# Patient Record
Sex: FEMALE | Race: WHITE
[De-identification: names, ages, dates, MRNs, and addresses within clinical notes are randomized per-mention and may not be internally consistent; named-entity substitution may affect disease eponyms.]

---

## 2019-03-14 ENCOUNTER — HOSPITAL ENCOUNTER (EMERGENCY)
Dept: HOSPITAL 8 - ED | Age: 29
Discharge: HOME | End: 2019-03-14
Payer: MEDICAID

## 2019-03-14 VITALS — HEIGHT: 67 IN | WEIGHT: 293 LBS | BODY MASS INDEX: 45.99 KG/M2

## 2019-03-14 VITALS — SYSTOLIC BLOOD PRESSURE: 123 MMHG | DIASTOLIC BLOOD PRESSURE: 85 MMHG

## 2019-03-14 DIAGNOSIS — J06.9: Primary | ICD-10-CM

## 2019-03-14 LAB
ALBUMIN SERPL-MCNC: 3.8 G/DL (ref 3.4–5)
ANION GAP SERPL CALC-SCNC: 8 MMOL/L (ref 5–15)
BASOPHILS # BLD AUTO: 0.06 X10^3/UL (ref 0–0.1)
BASOPHILS NFR BLD AUTO: 1 % (ref 0–1)
CALCIUM SERPL-MCNC: 9 MG/DL (ref 8.5–10.1)
CHLORIDE SERPL-SCNC: 108 MMOL/L (ref 98–107)
CREAT SERPL-MCNC: 0.98 MG/DL (ref 0.55–1.02)
EOSINOPHIL # BLD AUTO: 0.17 X10^3/UL (ref 0–0.4)
EOSINOPHIL NFR BLD AUTO: 2 % (ref 1–7)
ERYTHROCYTE [DISTWIDTH] IN BLOOD BY AUTOMATED COUNT: 13.7 % (ref 9.6–15.2)
INR PPP: 1.09 (ref 0.93–1.1)
LYMPHOCYTES # BLD AUTO: 2.08 X10^3/UL (ref 1–3.4)
LYMPHOCYTES NFR BLD AUTO: 30 % (ref 22–44)
MCH RBC QN AUTO: 30 PG (ref 27–34.8)
MCHC RBC AUTO-ENTMCNC: 34 G/DL (ref 32.4–35.8)
MCV RBC AUTO: 88.2 FL (ref 80–100)
MD: NO
MONOCYTES # BLD AUTO: 0.98 X10^3/UL (ref 0.2–0.8)
MONOCYTES NFR BLD AUTO: 14 % (ref 2–9)
NEUTROPHILS # BLD AUTO: 3.55 X10^3/UL (ref 1.8–6.8)
NEUTROPHILS NFR BLD AUTO: 52 % (ref 42–75)
PLATELET # BLD AUTO: 267 X10^3/UL (ref 130–400)
PMV BLD AUTO: 8.4 FL (ref 7.4–10.4)
PROTHROMBIN TIME: 11.4 SECONDS (ref 9.6–11.5)
RBC # BLD AUTO: 5.86 X10^6/UL (ref 3.82–5.3)
TROPONIN I SERPL-MCNC: < 0.015 NG/ML (ref 0–0.04)

## 2019-03-14 PROCEDURE — 85610 PROTHROMBIN TIME: CPT

## 2019-03-14 PROCEDURE — 99284 EMERGENCY DEPT VISIT MOD MDM: CPT

## 2019-03-14 PROCEDURE — 82040 ASSAY OF SERUM ALBUMIN: CPT

## 2019-03-14 PROCEDURE — 93005 ELECTROCARDIOGRAM TRACING: CPT

## 2019-03-14 PROCEDURE — 85025 COMPLETE CBC W/AUTO DIFF WBC: CPT

## 2019-03-14 PROCEDURE — 84484 ASSAY OF TROPONIN QUANT: CPT

## 2019-03-14 PROCEDURE — 36415 COLL VENOUS BLD VENIPUNCTURE: CPT

## 2019-03-14 PROCEDURE — 94640 AIRWAY INHALATION TREATMENT: CPT

## 2019-03-14 PROCEDURE — 71046 X-RAY EXAM CHEST 2 VIEWS: CPT

## 2019-03-14 PROCEDURE — 80048 BASIC METABOLIC PNL TOTAL CA: CPT

## 2019-03-14 NOTE — NUR
FIRST CONTACT WITH PATIENT. 27 Y/O FEMALE PRESENTS TO ED WITH C/O COUGH 
CONGESTIONS X 1 WEEK. "I'VE BEEN SICK FOR ABOUT A WEEK. THE LAST TIME I WAS 
SICK LIKE THIS I WAS IN HEART FAILURE. I'VE HAD A LOT OF SURGERIES ON MY HEART 
AND CARDIAC HISTORY. I JUST WANT TO MAKE SURE. I HAVE SOME CONSTANT CP. IT 
STARTED YESTERDAY. THE PAIN GOT WORSE TODAY SO I CAME IN." NO ACUTE DISTRESS 
NOTED. NO C/O N/V/D, TRAUMA, SYNCOPE. PT PLACED ON CONT PULSE OX, NIBP, CARDIAC 
MONITOR.

## 2019-08-07 ENCOUNTER — HOSPITAL ENCOUNTER (EMERGENCY)
Dept: HOSPITAL 8 - ED | Age: 29
LOS: 1 days | Discharge: HOME | End: 2019-08-08
Payer: MEDICAID

## 2019-08-07 VITALS — WEIGHT: 293 LBS | BODY MASS INDEX: 45.99 KG/M2 | HEIGHT: 67 IN

## 2019-08-07 DIAGNOSIS — R07.2: Primary | ICD-10-CM

## 2019-08-07 PROCEDURE — 71046 X-RAY EXAM CHEST 2 VIEWS: CPT

## 2019-08-07 PROCEDURE — 93005 ELECTROCARDIOGRAM TRACING: CPT

## 2019-08-07 PROCEDURE — 36415 COLL VENOUS BLD VENIPUNCTURE: CPT

## 2019-08-07 PROCEDURE — 96376 TX/PRO/DX INJ SAME DRUG ADON: CPT

## 2019-08-07 PROCEDURE — 85025 COMPLETE CBC W/AUTO DIFF WBC: CPT

## 2019-08-07 PROCEDURE — 82040 ASSAY OF SERUM ALBUMIN: CPT

## 2019-08-07 PROCEDURE — 84484 ASSAY OF TROPONIN QUANT: CPT

## 2019-08-07 PROCEDURE — 96374 THER/PROPH/DIAG INJ IV PUSH: CPT

## 2019-08-07 PROCEDURE — 96375 TX/PRO/DX INJ NEW DRUG ADDON: CPT

## 2019-08-07 PROCEDURE — 83880 ASSAY OF NATRIURETIC PEPTIDE: CPT

## 2019-08-07 PROCEDURE — 80048 BASIC METABOLIC PNL TOTAL CA: CPT

## 2019-08-07 PROCEDURE — 99284 EMERGENCY DEPT VISIT MOD MDM: CPT

## 2019-08-07 PROCEDURE — 85610 PROTHROMBIN TIME: CPT

## 2019-08-07 PROCEDURE — 85379 FIBRIN DEGRADATION QUANT: CPT

## 2019-08-07 PROCEDURE — 71275 CT ANGIOGRAPHY CHEST: CPT

## 2019-08-08 VITALS — SYSTOLIC BLOOD PRESSURE: 98 MMHG | DIASTOLIC BLOOD PRESSURE: 68 MMHG

## 2019-11-18 ENCOUNTER — HOSPITAL ENCOUNTER (EMERGENCY)
Dept: HOSPITAL 8 - ED | Age: 29
Discharge: HOME | End: 2019-11-18
Payer: MEDICAID

## 2019-11-18 VITALS — BODY MASS INDEX: 45.99 KG/M2 | HEIGHT: 67 IN | WEIGHT: 293 LBS

## 2019-11-18 VITALS — DIASTOLIC BLOOD PRESSURE: 60 MMHG | SYSTOLIC BLOOD PRESSURE: 113 MMHG

## 2019-11-18 DIAGNOSIS — R07.89: Primary | ICD-10-CM

## 2019-11-18 DIAGNOSIS — Z79.01: ICD-10-CM

## 2019-11-18 LAB
ALBUMIN SERPL-MCNC: 3.5 G/DL (ref 3.4–5)
ALP SERPL-CCNC: 64 U/L (ref 45–117)
ALT SERPL-CCNC: 64 U/L (ref 12–78)
ANION GAP SERPL CALC-SCNC: 8 MMOL/L (ref 5–15)
APTT BLD: 58 SECONDS (ref 25–31)
BASOPHILS # BLD AUTO: 0.05 X10^3/UL (ref 0–0.1)
BASOPHILS NFR BLD AUTO: 1 % (ref 0–1)
BILIRUB SERPL-MCNC: 0.6 MG/DL (ref 0.2–1)
CALCIUM SERPL-MCNC: 8.8 MG/DL (ref 8.5–10.1)
CHLORIDE SERPL-SCNC: 106 MMOL/L (ref 98–107)
CREAT SERPL-MCNC: 0.89 MG/DL (ref 0.55–1.02)
EOSINOPHIL # BLD AUTO: 0.19 X10^3/UL (ref 0–0.4)
EOSINOPHIL NFR BLD AUTO: 2 % (ref 1–7)
ERYTHROCYTE [DISTWIDTH] IN BLOOD BY AUTOMATED COUNT: 13.3 % (ref 9.6–15.2)
INR PPP: 5.5 (ref 0.93–1.1)
LYMPHOCYTES # BLD AUTO: 1.81 X10^3/UL (ref 1–3.4)
LYMPHOCYTES NFR BLD AUTO: 20 % (ref 22–44)
MCH RBC QN AUTO: 30.3 PG (ref 27–34.8)
MCHC RBC AUTO-ENTMCNC: 33.2 G/DL (ref 32.4–35.8)
MCV RBC AUTO: 91.3 FL (ref 80–100)
MD: NO
MONOCYTES # BLD AUTO: 0.56 X10^3/UL (ref 0.2–0.8)
MONOCYTES NFR BLD AUTO: 6 % (ref 2–9)
NEUTROPHILS # BLD AUTO: 6.26 X10^3/UL (ref 1.8–6.8)
NEUTROPHILS NFR BLD AUTO: 71 % (ref 42–75)
PLATELET # BLD AUTO: 288 X10^3/UL (ref 130–400)
PMV BLD AUTO: 8.4 FL (ref 7.4–10.4)
PROT SERPL-MCNC: 8 G/DL (ref 6.4–8.2)
PROTHROMBIN TIME: 54.1 SECONDS (ref 9.6–11.5)
RBC # BLD AUTO: 5.48 X10^6/UL (ref 3.82–5.3)
TROPONIN I SERPL-MCNC: < 0.015 NG/ML (ref 0–0.04)

## 2019-11-18 PROCEDURE — 84484 ASSAY OF TROPONIN QUANT: CPT

## 2019-11-18 PROCEDURE — 85610 PROTHROMBIN TIME: CPT

## 2019-11-18 PROCEDURE — 83880 ASSAY OF NATRIURETIC PEPTIDE: CPT

## 2019-11-18 PROCEDURE — 36415 COLL VENOUS BLD VENIPUNCTURE: CPT

## 2019-11-18 PROCEDURE — 99284 EMERGENCY DEPT VISIT MOD MDM: CPT

## 2019-11-18 PROCEDURE — 71046 X-RAY EXAM CHEST 2 VIEWS: CPT

## 2019-11-18 PROCEDURE — 85730 THROMBOPLASTIN TIME PARTIAL: CPT

## 2019-11-18 PROCEDURE — 85025 COMPLETE CBC W/AUTO DIFF WBC: CPT

## 2019-11-18 PROCEDURE — 80053 COMPREHEN METABOLIC PANEL: CPT

## 2019-11-18 PROCEDURE — 93005 ELECTROCARDIOGRAM TRACING: CPT

## 2019-11-18 PROCEDURE — 84443 ASSAY THYROID STIM HORMONE: CPT

## 2019-11-20 ENCOUNTER — HOSPITAL ENCOUNTER (OUTPATIENT)
Dept: HOSPITAL 8 - ED | Age: 29
Setting detail: OBSERVATION
LOS: 3 days | Discharge: HOME | End: 2019-11-23
Attending: FAMILY MEDICINE | Admitting: FAMILY MEDICINE
Payer: MEDICAID

## 2019-11-20 VITALS — WEIGHT: 293 LBS | HEIGHT: 67 IN | BODY MASS INDEX: 45.99 KG/M2

## 2019-11-20 DIAGNOSIS — F41.9: ICD-10-CM

## 2019-11-20 DIAGNOSIS — R07.89: Primary | ICD-10-CM

## 2019-11-20 DIAGNOSIS — I42.8: ICD-10-CM

## 2019-11-20 DIAGNOSIS — Z79.82: ICD-10-CM

## 2019-11-20 DIAGNOSIS — Z79.899: ICD-10-CM

## 2019-11-20 DIAGNOSIS — I50.22: ICD-10-CM

## 2019-11-20 DIAGNOSIS — Z79.01: ICD-10-CM

## 2019-11-20 DIAGNOSIS — Z95.2: ICD-10-CM

## 2019-11-20 DIAGNOSIS — I50.23: ICD-10-CM

## 2019-11-20 DIAGNOSIS — I44.7: ICD-10-CM

## 2019-11-20 DIAGNOSIS — Z23: ICD-10-CM

## 2019-11-20 DIAGNOSIS — Q23.0: ICD-10-CM

## 2019-11-20 DIAGNOSIS — F32.9: ICD-10-CM

## 2019-11-20 DIAGNOSIS — Z87.891: ICD-10-CM

## 2019-11-20 DIAGNOSIS — I42.9: ICD-10-CM

## 2019-11-20 DIAGNOSIS — Z95.0: ICD-10-CM

## 2019-11-20 DIAGNOSIS — E66.9: ICD-10-CM

## 2019-11-20 LAB
ALBUMIN SERPL-MCNC: 3.3 G/DL (ref 3.4–5)
ANION GAP SERPL CALC-SCNC: 5 MMOL/L (ref 5–15)
BASOPHILS # BLD AUTO: 0.13 X10^3/UL (ref 0–0.1)
BASOPHILS NFR BLD AUTO: 1 % (ref 0–1)
CALCIUM SERPL-MCNC: 9.3 MG/DL (ref 8.5–10.1)
CHLORIDE SERPL-SCNC: 109 MMOL/L (ref 98–107)
CREAT SERPL-MCNC: 0.9 MG/DL (ref 0.55–1.02)
EOSINOPHIL # BLD AUTO: 0.3 X10^3/UL (ref 0–0.4)
EOSINOPHIL NFR BLD AUTO: 3 % (ref 1–7)
ERYTHROCYTE [DISTWIDTH] IN BLOOD BY AUTOMATED COUNT: 13.1 % (ref 9.6–15.2)
INR PPP: 1.43 (ref 0.93–1.1)
LYMPHOCYTES # BLD AUTO: 2.66 X10^3/UL (ref 1–3.4)
LYMPHOCYTES NFR BLD AUTO: 28 % (ref 22–44)
MCH RBC QN AUTO: 31 PG (ref 27–34.8)
MCHC RBC AUTO-ENTMCNC: 34.3 G/DL (ref 32.4–35.8)
MCV RBC AUTO: 90.3 FL (ref 80–100)
MD: NO
MONOCYTES # BLD AUTO: 0.68 X10^3/UL (ref 0.2–0.8)
MONOCYTES NFR BLD AUTO: 7 % (ref 2–9)
NEUTROPHILS # BLD AUTO: 5.74 X10^3/UL (ref 1.8–6.8)
NEUTROPHILS NFR BLD AUTO: 60 % (ref 42–75)
PLATELET # BLD AUTO: 284 X10^3/UL (ref 130–400)
PMV BLD AUTO: 8.2 FL (ref 7.4–10.4)
PROTHROMBIN TIME: 14.8 SECONDS (ref 9.6–11.5)
RBC # BLD AUTO: 5.1 X10^6/UL (ref 3.82–5.3)
TROPONIN I SERPL-MCNC: < 0.015 NG/ML (ref 0–0.04)

## 2019-11-20 PROCEDURE — 84484 ASSAY OF TROPONIN QUANT: CPT

## 2019-11-20 PROCEDURE — 96374 THER/PROPH/DIAG INJ IV PUSH: CPT

## 2019-11-20 PROCEDURE — 93017 CV STRESS TEST TRACING ONLY: CPT

## 2019-11-20 PROCEDURE — C8929 TTE W OR WO FOL WCON,DOPPLER: HCPCS

## 2019-11-20 PROCEDURE — 71275 CT ANGIOGRAPHY CHEST: CPT

## 2019-11-20 PROCEDURE — 80048 BASIC METABOLIC PNL TOTAL CA: CPT

## 2019-11-20 PROCEDURE — 82040 ASSAY OF SERUM ALBUMIN: CPT

## 2019-11-20 PROCEDURE — 96375 TX/PRO/DX INJ NEW DRUG ADDON: CPT

## 2019-11-20 PROCEDURE — 93005 ELECTROCARDIOGRAM TRACING: CPT

## 2019-11-20 PROCEDURE — G0378 HOSPITAL OBSERVATION PER HR: HCPCS

## 2019-11-20 PROCEDURE — 90471 IMMUNIZATION ADMIN: CPT

## 2019-11-20 PROCEDURE — 78452 HT MUSCLE IMAGE SPECT MULT: CPT

## 2019-11-20 PROCEDURE — 90686 IIV4 VACC NO PRSV 0.5 ML IM: CPT

## 2019-11-20 PROCEDURE — A9502 TC99M TETROFOSMIN: HCPCS

## 2019-11-20 PROCEDURE — 85610 PROTHROMBIN TIME: CPT

## 2019-11-20 PROCEDURE — 85025 COMPLETE CBC W/AUTO DIFF WBC: CPT

## 2019-11-20 PROCEDURE — 36415 COLL VENOUS BLD VENIPUNCTURE: CPT

## 2019-11-20 PROCEDURE — 99285 EMERGENCY DEPT VISIT HI MDM: CPT

## 2019-11-20 PROCEDURE — 96372 THER/PROPH/DIAG INJ SC/IM: CPT

## 2019-11-20 PROCEDURE — C9898 INPNT STAY RADIOLABELED ITEM: HCPCS

## 2019-11-21 VITALS — DIASTOLIC BLOOD PRESSURE: 107 MMHG | SYSTOLIC BLOOD PRESSURE: 170 MMHG

## 2019-11-21 VITALS — DIASTOLIC BLOOD PRESSURE: 95 MMHG | SYSTOLIC BLOOD PRESSURE: 170 MMHG

## 2019-11-21 VITALS — DIASTOLIC BLOOD PRESSURE: 66 MMHG | SYSTOLIC BLOOD PRESSURE: 103 MMHG

## 2019-11-21 VITALS — SYSTOLIC BLOOD PRESSURE: 145 MMHG | DIASTOLIC BLOOD PRESSURE: 100 MMHG

## 2019-11-21 VITALS — DIASTOLIC BLOOD PRESSURE: 68 MMHG | SYSTOLIC BLOOD PRESSURE: 104 MMHG

## 2019-11-21 VITALS — SYSTOLIC BLOOD PRESSURE: 118 MMHG | DIASTOLIC BLOOD PRESSURE: 82 MMHG

## 2019-11-21 LAB
INR PPP: 1.3 (ref 0.93–1.1)
PROTHROMBIN TIME: 13.5 SECONDS (ref 9.6–11.5)
TROPONIN I SERPL-MCNC: < 0.015 NG/ML (ref 0–0.04)

## 2019-11-21 RX ADMIN — ACETAMINOPHEN PRN MG: 325 TABLET, FILM COATED ORAL at 14:55

## 2019-11-21 RX ADMIN — AMITRIPTYLINE HYDROCHLORIDE SCH MG: 10 TABLET, FILM COATED ORAL at 01:47

## 2019-11-21 RX ADMIN — Medication SCH NOTE: at 08:33

## 2019-11-21 RX ADMIN — AMITRIPTYLINE HYDROCHLORIDE SCH MG: 10 TABLET, FILM COATED ORAL at 22:00

## 2019-11-21 RX ADMIN — ACETAMINOPHEN PRN MG: 325 TABLET, FILM COATED ORAL at 19:09

## 2019-11-21 RX ADMIN — LIDOCAINE SCH PATCH: 50 PATCH CUTANEOUS at 21:00

## 2019-11-21 RX ADMIN — ACETAMINOPHEN PRN MG: 325 TABLET, FILM COATED ORAL at 01:49

## 2019-11-22 VITALS — SYSTOLIC BLOOD PRESSURE: 140 MMHG | DIASTOLIC BLOOD PRESSURE: 97 MMHG

## 2019-11-22 VITALS — DIASTOLIC BLOOD PRESSURE: 68 MMHG | SYSTOLIC BLOOD PRESSURE: 114 MMHG

## 2019-11-22 VITALS — DIASTOLIC BLOOD PRESSURE: 84 MMHG | SYSTOLIC BLOOD PRESSURE: 118 MMHG

## 2019-11-22 VITALS — SYSTOLIC BLOOD PRESSURE: 137 MMHG | DIASTOLIC BLOOD PRESSURE: 99 MMHG

## 2019-11-22 LAB
INR PPP: 1.18 (ref 0.93–1.1)
PROTHROMBIN TIME: 12.3 SECONDS (ref 9.6–11.5)

## 2019-11-22 RX ADMIN — Medication SCH NOTE: at 08:57

## 2019-11-22 RX ADMIN — POLYETHYLENE GLYCOL 3350 SCH GM: 17 POWDER, FOR SOLUTION ORAL at 10:23

## 2019-11-22 RX ADMIN — LIDOCAINE SCH PATCH: 50 PATCH CUTANEOUS at 20:03

## 2019-11-22 RX ADMIN — AMITRIPTYLINE HYDROCHLORIDE SCH MG: 10 TABLET, FILM COATED ORAL at 20:02

## 2019-11-22 RX ADMIN — ONDANSETRON PRN MG: 4 TABLET, ORALLY DISINTEGRATING ORAL at 11:06

## 2019-11-22 RX ADMIN — ONDANSETRON PRN MG: 4 TABLET, ORALLY DISINTEGRATING ORAL at 20:46

## 2019-11-23 VITALS — DIASTOLIC BLOOD PRESSURE: 96 MMHG | SYSTOLIC BLOOD PRESSURE: 132 MMHG

## 2019-11-23 VITALS — DIASTOLIC BLOOD PRESSURE: 102 MMHG | SYSTOLIC BLOOD PRESSURE: 146 MMHG

## 2019-11-23 VITALS — DIASTOLIC BLOOD PRESSURE: 91 MMHG | SYSTOLIC BLOOD PRESSURE: 127 MMHG

## 2019-11-23 VITALS — SYSTOLIC BLOOD PRESSURE: 106 MMHG | DIASTOLIC BLOOD PRESSURE: 70 MMHG

## 2019-11-23 LAB
INR PPP: 2.01 (ref 0.93–1.1)
PROTHROMBIN TIME: 20.5 SECONDS (ref 9.6–11.5)

## 2019-11-23 RX ADMIN — ONDANSETRON PRN MG: 4 TABLET, ORALLY DISINTEGRATING ORAL at 11:36

## 2019-11-23 RX ADMIN — Medication SCH NOTE: at 09:00

## 2019-11-23 RX ADMIN — POLYETHYLENE GLYCOL 3350 SCH GM: 17 POWDER, FOR SOLUTION ORAL at 12:37

## 2019-11-23 RX ADMIN — ACETAMINOPHEN PRN MG: 325 TABLET, FILM COATED ORAL at 15:52

## 2020-06-09 ENCOUNTER — HOSPITAL ENCOUNTER (EMERGENCY)
Dept: HOSPITAL 8 - ED | Age: 30
Discharge: HOME | End: 2020-06-09
Payer: MEDICAID

## 2020-06-09 VITALS — DIASTOLIC BLOOD PRESSURE: 82 MMHG | SYSTOLIC BLOOD PRESSURE: 123 MMHG

## 2020-06-09 VITALS — BODY MASS INDEX: 45.99 KG/M2 | WEIGHT: 293 LBS | HEIGHT: 67 IN

## 2020-06-09 DIAGNOSIS — Z95.0: ICD-10-CM

## 2020-06-09 DIAGNOSIS — R07.2: Primary | ICD-10-CM

## 2020-06-09 DIAGNOSIS — R00.2: ICD-10-CM

## 2020-06-09 LAB
ALBUMIN SERPL-MCNC: 3 G/DL (ref 3.4–5)
ALP SERPL-CCNC: 58 U/L (ref 45–117)
ALT SERPL-CCNC: 23 U/L (ref 12–78)
ANION GAP SERPL CALC-SCNC: 9 MMOL/L (ref 5–15)
BASOPHILS # BLD AUTO: 0.12 X10^3/UL (ref 0–0.1)
BASOPHILS NFR BLD AUTO: 2 % (ref 0–1)
BILIRUB SERPL-MCNC: 0.8 MG/DL (ref 0.2–1)
CALCIUM SERPL-MCNC: 8.1 MG/DL (ref 8.5–10.1)
CHLORIDE SERPL-SCNC: 109 MMOL/L (ref 98–107)
CREAT SERPL-MCNC: 0.82 MG/DL (ref 0.55–1.02)
EOSINOPHIL # BLD AUTO: 0.48 X10^3/UL (ref 0–0.4)
EOSINOPHIL NFR BLD AUTO: 6 % (ref 1–7)
ERYTHROCYTE [DISTWIDTH] IN BLOOD BY AUTOMATED COUNT: 13.5 % (ref 9.6–15.2)
INR PPP: 1.21 (ref 0.93–1.1)
LYMPHOCYTES # BLD AUTO: 1.97 X10^3/UL (ref 1–3.4)
LYMPHOCYTES NFR BLD AUTO: 25 % (ref 22–44)
MCH RBC QN AUTO: 30.4 PG (ref 27–34.8)
MCHC RBC AUTO-ENTMCNC: 33.5 G/DL (ref 32.4–35.8)
MCV RBC AUTO: 90.8 FL (ref 80–100)
MD: NO
MONOCYTES # BLD AUTO: 0.73 X10^3/UL (ref 0.2–0.8)
MONOCYTES NFR BLD AUTO: 9 % (ref 2–9)
NEUTROPHILS # BLD AUTO: 4.44 X10^3/UL (ref 1.8–6.8)
NEUTROPHILS NFR BLD AUTO: 57 % (ref 42–75)
PLATELET # BLD AUTO: 244 X10^3/UL (ref 130–400)
PMV BLD AUTO: 8.4 FL (ref 7.4–10.4)
PROT SERPL-MCNC: 6.9 G/DL (ref 6.4–8.2)
PROTHROMBIN TIME: 12.9 SECONDS (ref 9.6–11.5)
RBC # BLD AUTO: 4.92 X10^6/UL (ref 3.82–5.3)
TROPONIN I SERPL-MCNC: < 0.015 NG/ML (ref 0–0.04)
TROPONIN I SERPL-MCNC: < 0.015 NG/ML (ref 0–0.04)

## 2020-06-09 PROCEDURE — 83880 ASSAY OF NATRIURETIC PEPTIDE: CPT

## 2020-06-09 PROCEDURE — 36415 COLL VENOUS BLD VENIPUNCTURE: CPT

## 2020-06-09 PROCEDURE — 96376 TX/PRO/DX INJ SAME DRUG ADON: CPT

## 2020-06-09 PROCEDURE — 85610 PROTHROMBIN TIME: CPT

## 2020-06-09 PROCEDURE — 85025 COMPLETE CBC W/AUTO DIFF WBC: CPT

## 2020-06-09 PROCEDURE — 96375 TX/PRO/DX INJ NEW DRUG ADDON: CPT

## 2020-06-09 PROCEDURE — 99285 EMERGENCY DEPT VISIT HI MDM: CPT

## 2020-06-09 PROCEDURE — 84484 ASSAY OF TROPONIN QUANT: CPT

## 2020-06-09 PROCEDURE — 71275 CT ANGIOGRAPHY CHEST: CPT

## 2020-06-09 PROCEDURE — 80053 COMPREHEN METABOLIC PANEL: CPT

## 2020-06-09 PROCEDURE — 84703 CHORIONIC GONADOTROPIN ASSAY: CPT

## 2020-06-09 PROCEDURE — 71045 X-RAY EXAM CHEST 1 VIEW: CPT

## 2020-06-09 PROCEDURE — 93005 ELECTROCARDIOGRAM TRACING: CPT

## 2020-06-09 PROCEDURE — 96374 THER/PROPH/DIAG INJ IV PUSH: CPT

## 2020-06-09 RX ADMIN — MORPHINE SULFATE PRN MG: 4 INJECTION INTRAVENOUS at 07:39

## 2020-06-09 RX ADMIN — MORPHINE SULFATE PRN MG: 4 INJECTION INTRAVENOUS at 06:29

## 2020-06-09 NOTE — NUR
pt medicated for left sided chest pain with ordered med.  will continue to 
monitor.  pt resting calmly in bed, call light within reach.

## 2020-06-09 NOTE — NUR
pt bib remsa for CP and palpations. pt reports palpations beginning last night 
and says that "sometimes happen and go away but today they didnt" then it " 
turned into chest pain this morning about 5 am and it woke me up." PULSES 2+, 
GROSS NEURO INTACT, ANOx4, MAEx4 BUT STATES SHE FEELS FATIGUED, P/W/D, NAD, 
RESP WNL, VSS, WCTM. call light on lap



4 mg zofran and 100 mcg fentanyl dusty DAVIDSON MD AT  FOR EVAL AND POC

## 2020-06-09 NOTE — NUR
pt resting in gurney, given warm blankets for comfort, call light on lap, NAD, 
RESP WNL, ABC intact, P/W/D, medicated per MAR. waiting for labs and rads 
results. WCTM.

## 2020-06-09 NOTE — NUR
pt resting calmly in bed.  no stated complaints at this time,  all test results 
back.  pt up for recheck.

## 2020-06-12 ENCOUNTER — HOSPITAL ENCOUNTER (EMERGENCY)
Dept: HOSPITAL 8 - ED | Age: 30
Discharge: HOME | End: 2020-06-12
Payer: MEDICAID

## 2020-06-12 VITALS — WEIGHT: 293 LBS | BODY MASS INDEX: 45.99 KG/M2 | HEIGHT: 67 IN

## 2020-06-12 VITALS — SYSTOLIC BLOOD PRESSURE: 118 MMHG | DIASTOLIC BLOOD PRESSURE: 69 MMHG

## 2020-06-12 DIAGNOSIS — R07.89: Primary | ICD-10-CM

## 2020-06-12 DIAGNOSIS — R94.31: ICD-10-CM

## 2020-06-12 DIAGNOSIS — R06.02: ICD-10-CM

## 2020-06-12 DIAGNOSIS — R11.0: ICD-10-CM

## 2020-06-12 DIAGNOSIS — R89.8: ICD-10-CM

## 2020-06-12 DIAGNOSIS — Z95.0: ICD-10-CM

## 2020-06-12 LAB
ALBUMIN SERPL-MCNC: 3 G/DL (ref 3.4–5)
ALP SERPL-CCNC: 59 U/L (ref 45–117)
ALT SERPL-CCNC: 22 U/L (ref 12–78)
ANION GAP SERPL CALC-SCNC: 8 MMOL/L (ref 5–15)
BASOPHILS # BLD AUTO: 0.05 X10^3/UL (ref 0–0.1)
BASOPHILS NFR BLD AUTO: 1 % (ref 0–1)
BILIRUB SERPL-MCNC: 0.8 MG/DL (ref 0.2–1)
CALCIUM SERPL-MCNC: 8.3 MG/DL (ref 8.5–10.1)
CHLORIDE SERPL-SCNC: 113 MMOL/L (ref 98–107)
CREAT SERPL-MCNC: 0.82 MG/DL (ref 0.55–1.02)
EOSINOPHIL # BLD AUTO: 0.49 X10^3/UL (ref 0–0.4)
EOSINOPHIL NFR BLD AUTO: 7 % (ref 1–7)
ERYTHROCYTE [DISTWIDTH] IN BLOOD BY AUTOMATED COUNT: 13 % (ref 9.6–15.2)
INR PPP: 1.17 (ref 0.93–1.1)
LYMPHOCYTES # BLD AUTO: 1.64 X10^3/UL (ref 1–3.4)
LYMPHOCYTES NFR BLD AUTO: 25 % (ref 22–44)
MCH RBC QN AUTO: 30.3 PG (ref 27–34.8)
MCHC RBC AUTO-ENTMCNC: 33.4 G/DL (ref 32.4–35.8)
MCV RBC AUTO: 90.8 FL (ref 80–100)
MD: NO
MONOCYTES # BLD AUTO: 0.55 X10^3/UL (ref 0.2–0.8)
MONOCYTES NFR BLD AUTO: 8 % (ref 2–9)
NEUTROPHILS # BLD AUTO: 3.92 X10^3/UL (ref 1.8–6.8)
NEUTROPHILS NFR BLD AUTO: 59 % (ref 42–75)
PLATELET # BLD AUTO: 227 X10^3/UL (ref 130–400)
PMV BLD AUTO: 8.2 FL (ref 7.4–10.4)
PROT SERPL-MCNC: 6.8 G/DL (ref 6.4–8.2)
PROTHROMBIN TIME: 12.4 SECONDS (ref 9.6–11.5)
RBC # BLD AUTO: 4.83 X10^6/UL (ref 3.82–5.3)
TROPONIN I SERPL-MCNC: < 0.015 NG/ML (ref 0–0.04)

## 2020-06-12 PROCEDURE — 85025 COMPLETE CBC W/AUTO DIFF WBC: CPT

## 2020-06-12 PROCEDURE — 83690 ASSAY OF LIPASE: CPT

## 2020-06-12 PROCEDURE — 85610 PROTHROMBIN TIME: CPT

## 2020-06-12 PROCEDURE — 84703 CHORIONIC GONADOTROPIN ASSAY: CPT

## 2020-06-12 PROCEDURE — 80053 COMPREHEN METABOLIC PANEL: CPT

## 2020-06-12 PROCEDURE — 96372 THER/PROPH/DIAG INJ SC/IM: CPT

## 2020-06-12 PROCEDURE — 36415 COLL VENOUS BLD VENIPUNCTURE: CPT

## 2020-06-12 PROCEDURE — 99285 EMERGENCY DEPT VISIT HI MDM: CPT

## 2020-06-12 PROCEDURE — 84484 ASSAY OF TROPONIN QUANT: CPT

## 2020-06-12 PROCEDURE — 93005 ELECTROCARDIOGRAM TRACING: CPT

## 2020-06-12 PROCEDURE — 74022 RADEX COMPL AQT ABD SERIES: CPT

## 2020-06-12 NOTE — NUR
pt has co of shoulder abdomen and chest pain, was here recently for same co, pt 
was given medications and had no relief. tender to palpiation. pt not in 
distress. resp even and unlabored. denies cough

## 2020-06-12 NOTE — NUR
MEDICATED PER ORDERS.



Patient/Caregiver given discharge instructions and they have confirmed that 
they understand the instructions.  Patient ambulatory with steady gait.

## 2020-06-15 ENCOUNTER — HOSPITAL ENCOUNTER (OUTPATIENT)
Dept: HOSPITAL 8 - ED | Age: 30
Setting detail: OBSERVATION
LOS: 1 days | Discharge: HOME | End: 2020-06-16
Attending: FAMILY MEDICINE | Admitting: FAMILY MEDICINE
Payer: MEDICAID

## 2020-06-15 VITALS — SYSTOLIC BLOOD PRESSURE: 118 MMHG | DIASTOLIC BLOOD PRESSURE: 82 MMHG

## 2020-06-15 VITALS — WEIGHT: 293 LBS | HEIGHT: 67 IN | BODY MASS INDEX: 45.99 KG/M2

## 2020-06-15 VITALS — DIASTOLIC BLOOD PRESSURE: 74 MMHG | SYSTOLIC BLOOD PRESSURE: 112 MMHG

## 2020-06-15 VITALS — DIASTOLIC BLOOD PRESSURE: 73 MMHG | SYSTOLIC BLOOD PRESSURE: 108 MMHG

## 2020-06-15 DIAGNOSIS — I50.9: ICD-10-CM

## 2020-06-15 DIAGNOSIS — I11.0: ICD-10-CM

## 2020-06-15 DIAGNOSIS — Z91.19: ICD-10-CM

## 2020-06-15 DIAGNOSIS — E66.01: ICD-10-CM

## 2020-06-15 DIAGNOSIS — Z95.0: ICD-10-CM

## 2020-06-15 DIAGNOSIS — K21.9: ICD-10-CM

## 2020-06-15 DIAGNOSIS — I35.0: ICD-10-CM

## 2020-06-15 DIAGNOSIS — G89.29: ICD-10-CM

## 2020-06-15 DIAGNOSIS — R07.89: Primary | ICD-10-CM

## 2020-06-15 DIAGNOSIS — Z79.899: ICD-10-CM

## 2020-06-15 DIAGNOSIS — I25.2: ICD-10-CM

## 2020-06-15 DIAGNOSIS — Z95.2: ICD-10-CM

## 2020-06-15 DIAGNOSIS — Z87.891: ICD-10-CM

## 2020-06-15 DIAGNOSIS — I25.5: ICD-10-CM

## 2020-06-15 LAB
ALBUMIN SERPL-MCNC: 3.2 G/DL (ref 3.4–5)
ANION GAP SERPL CALC-SCNC: 4 MMOL/L (ref 5–15)
BASOPHILS # BLD AUTO: 0.04 X10^3/UL (ref 0–0.1)
BASOPHILS NFR BLD AUTO: 1 % (ref 0–1)
CALCIUM SERPL-MCNC: 9 MG/DL (ref 8.5–10.1)
CHLORIDE SERPL-SCNC: 108 MMOL/L (ref 98–107)
CREAT SERPL-MCNC: 0.99 MG/DL (ref 0.55–1.02)
EOSINOPHIL # BLD AUTO: 0.64 X10^3/UL (ref 0–0.4)
EOSINOPHIL NFR BLD AUTO: 11 % (ref 1–7)
ERYTHROCYTE [DISTWIDTH] IN BLOOD BY AUTOMATED COUNT: 13.2 % (ref 9.6–15.2)
INR PPP: 1.75 (ref 0.93–1.1)
LYMPHOCYTES # BLD AUTO: 1.63 X10^3/UL (ref 1–3.4)
LYMPHOCYTES NFR BLD AUTO: 28 % (ref 22–44)
MCH RBC QN AUTO: 30.5 PG (ref 27–34.8)
MCHC RBC AUTO-ENTMCNC: 33.7 G/DL (ref 32.4–35.8)
MCV RBC AUTO: 90.6 FL (ref 80–100)
MD: NO
MONOCYTES # BLD AUTO: 0.49 X10^3/UL (ref 0.2–0.8)
MONOCYTES NFR BLD AUTO: 8 % (ref 2–9)
NEUTROPHILS # BLD AUTO: 2.99 X10^3/UL (ref 1.8–6.8)
NEUTROPHILS NFR BLD AUTO: 52 % (ref 42–75)
PLATELET # BLD AUTO: 215 X10^3/UL (ref 130–400)
PMV BLD AUTO: 8.9 FL (ref 7.4–10.4)
PROTHROMBIN TIME: 18.7 SECONDS (ref 9.6–11.5)
RBC # BLD AUTO: 4.83 X10^6/UL (ref 3.82–5.3)
TROPONIN I SERPL-MCNC: < 0.015 NG/ML (ref 0–0.04)
TROPONIN I SERPL-MCNC: < 0.015 NG/ML (ref 0–0.04)

## 2020-06-15 PROCEDURE — 99285 EMERGENCY DEPT VISIT HI MDM: CPT

## 2020-06-15 PROCEDURE — G0378 HOSPITAL OBSERVATION PER HR: HCPCS

## 2020-06-15 PROCEDURE — A9502 TC99M TETROFOSMIN: HCPCS

## 2020-06-15 PROCEDURE — 83880 ASSAY OF NATRIURETIC PEPTIDE: CPT

## 2020-06-15 PROCEDURE — 80048 BASIC METABOLIC PNL TOTAL CA: CPT

## 2020-06-15 PROCEDURE — 93971 EXTREMITY STUDY: CPT

## 2020-06-15 PROCEDURE — 85610 PROTHROMBIN TIME: CPT

## 2020-06-15 PROCEDURE — 82040 ASSAY OF SERUM ALBUMIN: CPT

## 2020-06-15 PROCEDURE — 96375 TX/PRO/DX INJ NEW DRUG ADDON: CPT

## 2020-06-15 PROCEDURE — 85025 COMPLETE CBC W/AUTO DIFF WBC: CPT

## 2020-06-15 PROCEDURE — 78452 HT MUSCLE IMAGE SPECT MULT: CPT

## 2020-06-15 PROCEDURE — 93017 CV STRESS TEST TRACING ONLY: CPT

## 2020-06-15 PROCEDURE — 36415 COLL VENOUS BLD VENIPUNCTURE: CPT

## 2020-06-15 PROCEDURE — 84484 ASSAY OF TROPONIN QUANT: CPT

## 2020-06-15 PROCEDURE — 93005 ELECTROCARDIOGRAM TRACING: CPT

## 2020-06-15 PROCEDURE — 96374 THER/PROPH/DIAG INJ IV PUSH: CPT

## 2020-06-15 PROCEDURE — 96376 TX/PRO/DX INJ SAME DRUG ADON: CPT

## 2020-06-15 PROCEDURE — 71045 X-RAY EXAM CHEST 1 VIEW: CPT

## 2020-06-15 RX ADMIN — CARVEDILOL SCH MG: 3.12 TABLET, FILM COATED ORAL at 17:27

## 2020-06-15 RX ADMIN — HYDROCODONE BITARTRATE AND ACETAMINOPHEN PRN TAB: 5; 325 TABLET ORAL at 12:57

## 2020-06-15 RX ADMIN — GABAPENTIN SCH MG: 300 CAPSULE ORAL at 20:23

## 2020-06-15 RX ADMIN — MORPHINE SULFATE PRN MG: 4 INJECTION INTRAVENOUS at 07:50

## 2020-06-15 RX ADMIN — MORPHINE SULFATE PRN MG: 4 INJECTION INTRAVENOUS at 09:24

## 2020-06-15 RX ADMIN — MORPHINE SULFATE PRN MG: 10 INJECTION INTRAVENOUS at 22:22

## 2020-06-15 RX ADMIN — GABAPENTIN SCH MG: 300 CAPSULE ORAL at 16:19

## 2020-06-15 RX ADMIN — HYDROCODONE BITARTRATE AND ACETAMINOPHEN PRN TAB: 5; 325 TABLET ORAL at 20:24

## 2020-06-15 RX ADMIN — MORPHINE SULFATE PRN MG: 10 INJECTION INTRAVENOUS at 18:23

## 2020-06-15 RX ADMIN — HYDROCODONE BITARTRATE AND ACETAMINOPHEN PRN TAB: 5; 325 TABLET ORAL at 16:20

## 2020-06-15 NOTE — NUR
PIV STARTED, LABS DRAWN. PT MEDICATED PER EMAR. VSS. PT AMBULATED TO THE BR W/ 
A STEADY GAIT. URINE CUP PROVIDED.

## 2020-06-15 NOTE — NUR
THIS IS A 29 YO F W/ C/O SHARP EPIGASTRIC/ STERNAL CP X1 WEEK. PT REPORTS SHE 
HAS HAD A SIMILAR PAIN SINCE NOVEMBER WHEN SHE WAS ADMITTED, HAS NOT FOLLOWED 
UP W/ CARDIOLOGY SINCE NOVEMBER. PT REPORT HX OF PACEMAKER, AORTIC STENT AND 
CHF. PT REPORTS PAIN WORSE WHILE LAYING DOWN. PT ALSO REPORTS NEW ONSET OF LEFT 
LEG EDEMA. PT AMBULATED TO THE ROOM W/ A STEADY GAIT. PT CONNECTED TO ALL 
MONITORING, VSS. VPACED ON THE MONITOR. NADN. CALL LIGHT IN REACH. PERRI BALL AT 
BEDSIDE FOR EVAL.

## 2020-06-15 NOTE — NUR
PT REPORTS PAIN NOW 8/10. MEDICATED PER EMAR. PT RESTING ON [employer] W/ CALL 
LIGHT IN REACH. DENIES FURTHER NEEDS AT THIS TIME.

## 2020-06-16 VITALS — DIASTOLIC BLOOD PRESSURE: 73 MMHG | SYSTOLIC BLOOD PRESSURE: 104 MMHG

## 2020-06-16 VITALS — SYSTOLIC BLOOD PRESSURE: 112 MMHG | DIASTOLIC BLOOD PRESSURE: 79 MMHG

## 2020-06-16 VITALS — DIASTOLIC BLOOD PRESSURE: 71 MMHG | SYSTOLIC BLOOD PRESSURE: 102 MMHG

## 2020-06-16 VITALS — DIASTOLIC BLOOD PRESSURE: 71 MMHG | SYSTOLIC BLOOD PRESSURE: 104 MMHG

## 2020-06-16 VITALS — DIASTOLIC BLOOD PRESSURE: 70 MMHG | SYSTOLIC BLOOD PRESSURE: 105 MMHG

## 2020-06-16 VITALS — DIASTOLIC BLOOD PRESSURE: 87 MMHG | SYSTOLIC BLOOD PRESSURE: 120 MMHG

## 2020-06-16 LAB
ANION GAP SERPL CALC-SCNC: 4 MMOL/L (ref 5–15)
CALCIUM SERPL-MCNC: 8.4 MG/DL (ref 8.5–10.1)
CHLORIDE SERPL-SCNC: 107 MMOL/L (ref 98–107)
CREAT SERPL-MCNC: 0.92 MG/DL (ref 0.55–1.02)
INR PPP: 1.49 (ref 0.93–1.1)
PROTHROMBIN TIME: 15.9 SECONDS (ref 9.6–11.5)

## 2020-06-16 RX ADMIN — CARVEDILOL SCH MG: 3.12 TABLET, FILM COATED ORAL at 06:13

## 2020-06-16 RX ADMIN — MORPHINE SULFATE PRN MG: 10 INJECTION INTRAVENOUS at 03:29

## 2020-06-16 RX ADMIN — GABAPENTIN SCH MG: 300 CAPSULE ORAL at 10:21

## 2020-06-16 RX ADMIN — MORPHINE SULFATE PRN MG: 10 INJECTION INTRAVENOUS at 07:43

## 2020-06-16 RX ADMIN — MORPHINE SULFATE PRN MG: 10 INJECTION INTRAVENOUS at 14:04

## 2020-06-16 RX ADMIN — MORPHINE SULFATE PRN MG: 10 INJECTION INTRAVENOUS at 10:57

## 2020-07-16 ENCOUNTER — HOSPITAL ENCOUNTER (OUTPATIENT)
Dept: HOSPITAL 8 - CVU | Age: 30
Discharge: HOME | End: 2020-07-16
Attending: INTERNAL MEDICINE
Payer: MEDICAID

## 2020-07-16 DIAGNOSIS — I44.7: ICD-10-CM

## 2020-07-16 DIAGNOSIS — Z95.1: ICD-10-CM

## 2020-07-16 DIAGNOSIS — I08.1: Primary | ICD-10-CM

## 2020-07-16 PROCEDURE — 93306 TTE W/DOPPLER COMPLETE: CPT

## 2020-07-28 ENCOUNTER — HOSPITAL ENCOUNTER (EMERGENCY)
Dept: HOSPITAL 8 - ED | Age: 30
End: 2020-07-28
Payer: MEDICAID

## 2020-07-28 VITALS — SYSTOLIC BLOOD PRESSURE: 100 MMHG | DIASTOLIC BLOOD PRESSURE: 73 MMHG

## 2020-07-28 DIAGNOSIS — I50.9: ICD-10-CM

## 2020-07-28 DIAGNOSIS — E66.9: ICD-10-CM

## 2020-07-28 DIAGNOSIS — Z79.01: ICD-10-CM

## 2020-07-28 DIAGNOSIS — R94.31: ICD-10-CM

## 2020-07-28 DIAGNOSIS — K59.00: ICD-10-CM

## 2020-07-28 DIAGNOSIS — R10.12: ICD-10-CM

## 2020-07-28 DIAGNOSIS — K29.00: Primary | ICD-10-CM

## 2020-07-28 DIAGNOSIS — I11.0: ICD-10-CM

## 2020-07-28 LAB
ALBUMIN SERPL-MCNC: 2.9 G/DL (ref 3.4–5)
ALP SERPL-CCNC: 58 U/L (ref 45–117)
ALT SERPL-CCNC: 29 U/L (ref 12–78)
ANION GAP SERPL CALC-SCNC: 7 MMOL/L (ref 5–15)
BASOPHILS # BLD AUTO: 0.03 X10^3/UL (ref 0–0.1)
BASOPHILS NFR BLD AUTO: 0 % (ref 0–1)
BILIRUB SERPL-MCNC: 0.5 MG/DL (ref 0.2–1)
CALCIUM SERPL-MCNC: 8.3 MG/DL (ref 8.5–10.1)
CHLORIDE SERPL-SCNC: 107 MMOL/L (ref 98–107)
CREAT SERPL-MCNC: 0.9 MG/DL (ref 0.55–1.02)
EOSINOPHIL # BLD AUTO: 0.41 X10^3/UL (ref 0–0.4)
EOSINOPHIL NFR BLD AUTO: 4 % (ref 1–7)
ERYTHROCYTE [DISTWIDTH] IN BLOOD BY AUTOMATED COUNT: 13.5 % (ref 9.6–15.2)
INR PPP: 5.77 (ref 0.93–1.1)
LYMPHOCYTES # BLD AUTO: 2.26 X10^3/UL (ref 1–3.4)
LYMPHOCYTES NFR BLD AUTO: 23 % (ref 22–44)
MCH RBC QN AUTO: 30.4 PG (ref 27–34.8)
MCHC RBC AUTO-ENTMCNC: 33.6 G/DL (ref 32.4–35.8)
MCV RBC AUTO: 90.4 FL (ref 80–100)
MD: NO
MICROSCOPIC: (no result)
MONOCYTES # BLD AUTO: 0.63 X10^3/UL (ref 0.2–0.8)
MONOCYTES NFR BLD AUTO: 6 % (ref 2–9)
NEUTROPHILS # BLD AUTO: 6.56 X10^3/UL (ref 1.8–6.8)
NEUTROPHILS NFR BLD AUTO: 66 % (ref 42–75)
PLATELET # BLD AUTO: 291 X10^3/UL (ref 130–400)
PMV BLD AUTO: 8.7 FL (ref 7.4–10.4)
PROT SERPL-MCNC: 7.6 G/DL (ref 6.4–8.2)
PROTHROMBIN TIME: 62.2 SECONDS (ref 9.6–11.5)
RBC # BLD AUTO: 5.17 X10^6/UL (ref 3.82–5.3)

## 2020-07-28 PROCEDURE — 74177 CT ABD & PELVIS W/CONTRAST: CPT

## 2020-07-28 PROCEDURE — 85610 PROTHROMBIN TIME: CPT

## 2020-07-28 PROCEDURE — 84703 CHORIONIC GONADOTROPIN ASSAY: CPT

## 2020-07-28 PROCEDURE — 36415 COLL VENOUS BLD VENIPUNCTURE: CPT

## 2020-07-28 PROCEDURE — 93005 ELECTROCARDIOGRAM TRACING: CPT

## 2020-07-28 PROCEDURE — 96376 TX/PRO/DX INJ SAME DRUG ADON: CPT

## 2020-07-28 PROCEDURE — 80053 COMPREHEN METABOLIC PANEL: CPT

## 2020-07-28 PROCEDURE — 83690 ASSAY OF LIPASE: CPT

## 2020-07-28 PROCEDURE — 81003 URINALYSIS AUTO W/O SCOPE: CPT

## 2020-07-28 PROCEDURE — 85025 COMPLETE CBC W/AUTO DIFF WBC: CPT

## 2020-07-28 PROCEDURE — 99285 EMERGENCY DEPT VISIT HI MDM: CPT

## 2020-07-28 PROCEDURE — 96375 TX/PRO/DX INJ NEW DRUG ADDON: CPT

## 2020-07-28 PROCEDURE — 96374 THER/PROPH/DIAG INJ IV PUSH: CPT

## 2020-07-28 RX ADMIN — MORPHINE SULFATE PRN MG: 4 INJECTION INTRAVENOUS at 22:06

## 2020-07-28 RX ADMIN — MORPHINE SULFATE PRN MG: 4 INJECTION INTRAVENOUS at 23:12

## 2020-08-04 ENCOUNTER — HOSPITAL ENCOUNTER (INPATIENT)
Dept: HOSPITAL 8 - ED | Age: 30
LOS: 4 days | Discharge: HOME | DRG: 68 | End: 2020-08-08
Attending: FAMILY MEDICINE | Admitting: FAMILY MEDICINE
Payer: MEDICAID

## 2020-08-04 VITALS — WEIGHT: 293 LBS | BODY MASS INDEX: 45.99 KG/M2 | HEIGHT: 67 IN

## 2020-08-04 DIAGNOSIS — Z86.73: ICD-10-CM

## 2020-08-04 DIAGNOSIS — Z79.899: ICD-10-CM

## 2020-08-04 DIAGNOSIS — I42.9: ICD-10-CM

## 2020-08-04 DIAGNOSIS — Z91.19: ICD-10-CM

## 2020-08-04 DIAGNOSIS — I50.9: ICD-10-CM

## 2020-08-04 DIAGNOSIS — Z71.3: ICD-10-CM

## 2020-08-04 DIAGNOSIS — Z79.01: ICD-10-CM

## 2020-08-04 DIAGNOSIS — F32.9: ICD-10-CM

## 2020-08-04 DIAGNOSIS — I66.02: Primary | ICD-10-CM

## 2020-08-04 DIAGNOSIS — Z83.3: ICD-10-CM

## 2020-08-04 DIAGNOSIS — I11.0: ICD-10-CM

## 2020-08-04 DIAGNOSIS — K21.9: ICD-10-CM

## 2020-08-04 DIAGNOSIS — Z95.0: ICD-10-CM

## 2020-08-04 DIAGNOSIS — R07.89: ICD-10-CM

## 2020-08-04 DIAGNOSIS — R07.81: ICD-10-CM

## 2020-08-04 DIAGNOSIS — Z95.2: ICD-10-CM

## 2020-08-04 DIAGNOSIS — H54.61: ICD-10-CM

## 2020-08-04 DIAGNOSIS — F41.9: ICD-10-CM

## 2020-08-04 DIAGNOSIS — Z87.891: ICD-10-CM

## 2020-08-04 DIAGNOSIS — H53.8: ICD-10-CM

## 2020-08-04 DIAGNOSIS — I25.2: ICD-10-CM

## 2020-08-04 DIAGNOSIS — E66.01: ICD-10-CM

## 2020-08-04 LAB
ALBUMIN SERPL-MCNC: 3.3 G/DL (ref 3.4–5)
ALP SERPL-CCNC: 62 U/L (ref 45–117)
ALT SERPL-CCNC: 25 U/L (ref 12–78)
ANION GAP SERPL CALC-SCNC: 6 MMOL/L (ref 5–15)
APTT BLD: 25 SECONDS (ref 25–31)
BASOPHILS # BLD AUTO: 0.04 X10^3/UL (ref 0–0.1)
BASOPHILS NFR BLD AUTO: 0 % (ref 0–1)
BILIRUB SERPL-MCNC: 0.9 MG/DL (ref 0.2–1)
CALCIUM SERPL-MCNC: 8.6 MG/DL (ref 8.5–10.1)
CHLORIDE SERPL-SCNC: 111 MMOL/L (ref 98–107)
CHOL/HDL RATIO: 5.2
CREAT SERPL-MCNC: 1.01 MG/DL (ref 0.55–1.02)
EOSINOPHIL # BLD AUTO: 0.19 X10^3/UL (ref 0–0.4)
EOSINOPHIL NFR BLD AUTO: 2 % (ref 1–7)
ERYTHROCYTE [DISTWIDTH] IN BLOOD BY AUTOMATED COUNT: 13 % (ref 9.6–15.2)
ERYTHROCYTE [SEDIMENTATION RATE] IN BLOOD BY WESTERGREN METHOD: 15 MM/HR (ref 0–20)
EST. AVERAGE GLUCOSE BLD GHB EST-MCNC: 105 MG/DL (ref 0–126)
HCT (SEDRATE): 44.5 % (ref 34.6–47.8)
HDL CHOL %: 19 % (ref 28–40)
HDL CHOLESTEROL (DIRECT): 37 MG/DL (ref 40–60)
INR PPP: 0.95 (ref 0.93–1.1)
LDL CHOLESTEROL,CALCULATED: 123 MG/DL (ref 54–169)
LDLC/HDLC SERPL: 3.3 {RATIO} (ref 0.5–3)
LYMPHOCYTES # BLD AUTO: 2.14 X10^3/UL (ref 1–3.4)
LYMPHOCYTES NFR BLD AUTO: 22 % (ref 22–44)
MCH RBC QN AUTO: 30.3 PG (ref 27–34.8)
MCHC RBC AUTO-ENTMCNC: 33.4 G/DL (ref 32.4–35.8)
MCV RBC AUTO: 90.8 FL (ref 80–100)
MD: NO
MONOCYTES # BLD AUTO: 0.77 X10^3/UL (ref 0.2–0.8)
MONOCYTES NFR BLD AUTO: 8 % (ref 2–9)
NEUTROPHILS # BLD AUTO: 6.49 X10^3/UL (ref 1.8–6.8)
NEUTROPHILS NFR BLD AUTO: 67 % (ref 42–75)
PLATELET # BLD AUTO: 251 X10^3/UL (ref 130–400)
PMV BLD AUTO: 8.7 FL (ref 7.4–10.4)
PROT SERPL-MCNC: 7.2 G/DL (ref 6.4–8.2)
PROTHROMBIN TIME: 10.1 SECONDS (ref 9.6–11.5)
RBC # BLD AUTO: 4.9 X10^6/UL (ref 3.82–5.3)
TRIGL SERPL-MCNC: 169 MG/DL (ref 50–200)
VLDLC SERPL CALC-MCNC: 34 MG/DL (ref 0–25)

## 2020-08-04 PROCEDURE — 70498 CT ANGIOGRAPHY NECK: CPT

## 2020-08-04 PROCEDURE — 96372 THER/PROPH/DIAG INJ SC/IM: CPT

## 2020-08-04 PROCEDURE — 70496 CT ANGIOGRAPHY HEAD: CPT

## 2020-08-04 PROCEDURE — 70450 CT HEAD/BRAIN W/O DYE: CPT

## 2020-08-04 PROCEDURE — 80053 COMPREHEN METABOLIC PANEL: CPT

## 2020-08-04 PROCEDURE — 84484 ASSAY OF TROPONIN QUANT: CPT

## 2020-08-04 PROCEDURE — 85027 COMPLETE CBC AUTOMATED: CPT

## 2020-08-04 PROCEDURE — 85730 THROMBOPLASTIN TIME PARTIAL: CPT

## 2020-08-04 PROCEDURE — 85610 PROTHROMBIN TIME: CPT

## 2020-08-04 PROCEDURE — 71045 X-RAY EXAM CHEST 1 VIEW: CPT

## 2020-08-04 PROCEDURE — 85025 COMPLETE CBC W/AUTO DIFF WBC: CPT

## 2020-08-04 PROCEDURE — 83036 HEMOGLOBIN GLYCOSYLATED A1C: CPT

## 2020-08-04 PROCEDURE — 85651 RBC SED RATE NONAUTOMATED: CPT

## 2020-08-04 PROCEDURE — 80061 LIPID PANEL: CPT

## 2020-08-04 PROCEDURE — 96374 THER/PROPH/DIAG INJ IV PUSH: CPT

## 2020-08-04 PROCEDURE — 84443 ASSAY THYROID STIM HORMONE: CPT

## 2020-08-04 PROCEDURE — 36415 COLL VENOUS BLD VENIPUNCTURE: CPT

## 2020-08-04 PROCEDURE — 93005 ELECTROCARDIOGRAM TRACING: CPT

## 2020-08-04 PROCEDURE — 93306 TTE W/DOPPLER COMPLETE: CPT

## 2020-08-04 RX ADMIN — GABAPENTIN PRN MG: 300 CAPSULE ORAL at 23:01

## 2020-08-04 RX ADMIN — OXYCODONE HYDROCHLORIDE PRN MG: 5 TABLET ORAL at 23:01

## 2020-08-04 RX ADMIN — ACETAMINOPHEN PRN MG: 325 TABLET, FILM COATED ORAL at 22:04

## 2020-08-04 NOTE — NUR
Report called to [facility] RODR[facility]GUE[facility], informed that pt must remain at 15degreee HOB. Pt 
also has possible ischemic stroke. Pt has pacemaker st judes that is not MRI 
compatible. Pt on hospital bed and recently medicated for pain.

## 2020-08-04 NOTE — NUR
BREAK RN: PT TO ED AFTER HA/BLURRED VISION STARTING YESTERDAY AND TODAY FELL 
AND HIT HEAD @1500. PT C/O SEVERE HA "WORST EVER". RIGHT PUPIL SLIGHTLY DILATED 
AND SLUGGISH TO RESPOND, NEURO OTHERWISE INTACT, PT AOX4, PT STATES 
SLURRED/LISP SPEECH IS HER BASELINE. PT ON COUMADIN AFTER AORTIC VALVE REPAIR. 
PT PLACED ON MONITOR, CALL LIGHT WITHIN REACH.

## 2020-08-05 VITALS — SYSTOLIC BLOOD PRESSURE: 113 MMHG | DIASTOLIC BLOOD PRESSURE: 77 MMHG

## 2020-08-05 VITALS — SYSTOLIC BLOOD PRESSURE: 102 MMHG | DIASTOLIC BLOOD PRESSURE: 75 MMHG

## 2020-08-05 VITALS — SYSTOLIC BLOOD PRESSURE: 137 MMHG | DIASTOLIC BLOOD PRESSURE: 89 MMHG

## 2020-08-05 VITALS — SYSTOLIC BLOOD PRESSURE: 110 MMHG | DIASTOLIC BLOOD PRESSURE: 75 MMHG

## 2020-08-05 VITALS — SYSTOLIC BLOOD PRESSURE: 94 MMHG | DIASTOLIC BLOOD PRESSURE: 63 MMHG

## 2020-08-05 VITALS — DIASTOLIC BLOOD PRESSURE: 83 MMHG | SYSTOLIC BLOOD PRESSURE: 118 MMHG

## 2020-08-05 VITALS — DIASTOLIC BLOOD PRESSURE: 84 MMHG | SYSTOLIC BLOOD PRESSURE: 121 MMHG

## 2020-08-05 VITALS — DIASTOLIC BLOOD PRESSURE: 87 MMHG | SYSTOLIC BLOOD PRESSURE: 125 MMHG

## 2020-08-05 VITALS — DIASTOLIC BLOOD PRESSURE: 75 MMHG | SYSTOLIC BLOOD PRESSURE: 112 MMHG

## 2020-08-05 LAB
ALBUMIN SERPL-MCNC: 3.3 G/DL (ref 3.4–5)
ALP SERPL-CCNC: 63 U/L (ref 45–117)
ALT SERPL-CCNC: 25 U/L (ref 12–78)
ANION GAP SERPL CALC-SCNC: 7 MMOL/L (ref 5–15)
BASOPHILS # BLD AUTO: 0.11 X10^3/UL (ref 0–0.1)
BASOPHILS NFR BLD AUTO: 1 % (ref 0–1)
BILIRUB SERPL-MCNC: 1.1 MG/DL (ref 0.2–1)
CALCIUM SERPL-MCNC: 8.5 MG/DL (ref 8.5–10.1)
CHLORIDE SERPL-SCNC: 112 MMOL/L (ref 98–107)
CREAT SERPL-MCNC: 0.88 MG/DL (ref 0.55–1.02)
EOSINOPHIL # BLD AUTO: 0.18 X10^3/UL (ref 0–0.4)
EOSINOPHIL NFR BLD AUTO: 2 % (ref 1–7)
ERYTHROCYTE [DISTWIDTH] IN BLOOD BY AUTOMATED COUNT: 13.2 % (ref 9.6–15.2)
ERYTHROCYTE [DISTWIDTH] IN BLOOD BY AUTOMATED COUNT: 13.3 % (ref 9.6–15.2)
INR PPP: 1 (ref 0.93–1.1)
LYMPHOCYTES # BLD AUTO: 2.39 X10^3/UL (ref 1–3.4)
LYMPHOCYTES NFR BLD AUTO: 28 % (ref 22–44)
MCH RBC QN AUTO: 29.9 PG (ref 27–34.8)
MCH RBC QN AUTO: 30.2 PG (ref 27–34.8)
MCHC RBC AUTO-ENTMCNC: 32.9 G/DL (ref 32.4–35.8)
MCHC RBC AUTO-ENTMCNC: 33 G/DL (ref 32.4–35.8)
MCV RBC AUTO: 90.9 FL (ref 80–100)
MCV RBC AUTO: 91.5 FL (ref 80–100)
MD: NO
MONOCYTES # BLD AUTO: 0.66 X10^3/UL (ref 0.2–0.8)
MONOCYTES NFR BLD AUTO: 8 % (ref 2–9)
NEUTROPHILS # BLD AUTO: 5.1 X10^3/UL (ref 1.8–6.8)
NEUTROPHILS NFR BLD AUTO: 60 % (ref 42–75)
PLATELET # BLD AUTO: 245 X10^3/UL (ref 130–400)
PLATELET # BLD AUTO: 260 X10^3/UL (ref 130–400)
PMV BLD AUTO: 8.3 FL (ref 7.4–10.4)
PMV BLD AUTO: 8.4 FL (ref 7.4–10.4)
PROT SERPL-MCNC: 7.1 G/DL (ref 6.4–8.2)
PROTHROMBIN TIME: 10.6 SECONDS (ref 9.6–11.5)
RBC # BLD AUTO: 5 X10^6/UL (ref 3.82–5.3)
RBC # BLD AUTO: 5.14 X10^6/UL (ref 3.82–5.3)
TROPONIN I SERPL-MCNC: < 0.015 NG/ML (ref 0–0.04)

## 2020-08-05 RX ADMIN — ENOXAPARIN SODIUM SCH MG: 80 INJECTION SUBCUTANEOUS at 19:58

## 2020-08-05 RX ADMIN — LISINOPRIL SCH MG: 5 TABLET ORAL at 08:39

## 2020-08-05 RX ADMIN — ACETAMINOPHEN PRN MG: 325 TABLET, FILM COATED ORAL at 13:21

## 2020-08-05 RX ADMIN — CARVEDILOL SCH MG: 3.12 TABLET, FILM COATED ORAL at 17:00

## 2020-08-05 RX ADMIN — ENOXAPARIN SODIUM SCH MG: 80 INJECTION SUBCUTANEOUS at 08:40

## 2020-08-05 RX ADMIN — GABAPENTIN PRN MG: 300 CAPSULE ORAL at 22:47

## 2020-08-05 RX ADMIN — DULOXETINE HYDROCHLORIDE SCH MG: 30 CAPSULE, DELAYED RELEASE ORAL at 08:38

## 2020-08-05 RX ADMIN — MORPHINE SULFATE PRN MG: 4 INJECTION INTRAVENOUS at 06:37

## 2020-08-05 RX ADMIN — OXYCODONE HYDROCHLORIDE PRN MG: 5 TABLET ORAL at 19:58

## 2020-08-05 RX ADMIN — OXYCODONE HYDROCHLORIDE PRN MG: 5 TABLET ORAL at 13:20

## 2020-08-05 RX ADMIN — OMEPRAZOLE SCH MG: 20 CAPSULE, DELAYED RELEASE ORAL at 08:38

## 2020-08-05 RX ADMIN — ATORVASTATIN CALCIUM SCH MG: 80 TABLET, FILM COATED ORAL at 19:58

## 2020-08-05 RX ADMIN — DOCUSATE SODIUM 50MG AND SENNOSIDES 8.6MG SCH TAB: 8.6; 5 TABLET, FILM COATED ORAL at 08:38

## 2020-08-05 RX ADMIN — ACETAMINOPHEN PRN MG: 325 TABLET, FILM COATED ORAL at 19:58

## 2020-08-05 RX ADMIN — DIVALPROEX SODIUM SCH MG: 500 TABLET, DELAYED RELEASE ORAL at 19:59

## 2020-08-05 RX ADMIN — ACETAMINOPHEN PRN MG: 325 TABLET, FILM COATED ORAL at 06:36

## 2020-08-05 RX ADMIN — ASPIRIN SCH MG: 81 TABLET, COATED ORAL at 10:35

## 2020-08-05 RX ADMIN — TRAZODONE HYDROCHLORIDE PRN MG: 50 TABLET ORAL at 22:47

## 2020-08-05 RX ADMIN — MORPHINE SULFATE PRN MG: 4 INJECTION INTRAVENOUS at 01:26

## 2020-08-06 VITALS — DIASTOLIC BLOOD PRESSURE: 76 MMHG | SYSTOLIC BLOOD PRESSURE: 106 MMHG

## 2020-08-06 VITALS — DIASTOLIC BLOOD PRESSURE: 74 MMHG | SYSTOLIC BLOOD PRESSURE: 111 MMHG

## 2020-08-06 VITALS — SYSTOLIC BLOOD PRESSURE: 119 MMHG | DIASTOLIC BLOOD PRESSURE: 84 MMHG

## 2020-08-06 VITALS — SYSTOLIC BLOOD PRESSURE: 107 MMHG | DIASTOLIC BLOOD PRESSURE: 73 MMHG

## 2020-08-06 LAB
INR PPP: 1.52 (ref 0.93–1.1)
PROTHROMBIN TIME: 16.2 SECONDS (ref 9.6–11.5)

## 2020-08-06 RX ADMIN — DOCUSATE SODIUM 50MG AND SENNOSIDES 8.6MG SCH TAB: 8.6; 5 TABLET, FILM COATED ORAL at 08:36

## 2020-08-06 RX ADMIN — GABAPENTIN PRN MG: 300 CAPSULE ORAL at 06:02

## 2020-08-06 RX ADMIN — Medication SCH NOTE: at 09:00

## 2020-08-06 RX ADMIN — CARVEDILOL SCH MG: 3.12 TABLET, FILM COATED ORAL at 06:03

## 2020-08-06 RX ADMIN — DOCUSATE SODIUM 50MG AND SENNOSIDES 8.6MG SCH TAB: 8.6; 5 TABLET, FILM COATED ORAL at 07:44

## 2020-08-06 RX ADMIN — TRAZODONE HYDROCHLORIDE PRN MG: 50 TABLET ORAL at 21:56

## 2020-08-06 RX ADMIN — DIVALPROEX SODIUM SCH MG: 500 TABLET, DELAYED RELEASE ORAL at 20:23

## 2020-08-06 RX ADMIN — OXYCODONE HYDROCHLORIDE PRN MG: 5 TABLET ORAL at 06:03

## 2020-08-06 RX ADMIN — OMEPRAZOLE SCH MG: 20 CAPSULE, DELAYED RELEASE ORAL at 08:36

## 2020-08-06 RX ADMIN — DULOXETINE HYDROCHLORIDE SCH MG: 30 CAPSULE, DELAYED RELEASE ORAL at 08:37

## 2020-08-06 RX ADMIN — GABAPENTIN PRN MG: 300 CAPSULE ORAL at 18:37

## 2020-08-06 RX ADMIN — OXYCODONE HYDROCHLORIDE PRN MG: 5 TABLET ORAL at 11:22

## 2020-08-06 RX ADMIN — DIVALPROEX SODIUM SCH MG: 500 TABLET, DELAYED RELEASE ORAL at 08:37

## 2020-08-06 RX ADMIN — ACETAMINOPHEN PRN MG: 325 TABLET, FILM COATED ORAL at 08:36

## 2020-08-06 RX ADMIN — LISINOPRIL SCH MG: 5 TABLET ORAL at 08:37

## 2020-08-06 RX ADMIN — ASPIRIN SCH MG: 81 TABLET, COATED ORAL at 06:02

## 2020-08-06 RX ADMIN — ATORVASTATIN CALCIUM SCH MG: 80 TABLET, FILM COATED ORAL at 20:23

## 2020-08-06 RX ADMIN — CARVEDILOL SCH MG: 3.12 TABLET, FILM COATED ORAL at 17:29

## 2020-08-06 RX ADMIN — ENOXAPARIN SODIUM SCH MG: 80 INJECTION SUBCUTANEOUS at 08:37

## 2020-08-06 RX ADMIN — ENOXAPARIN SODIUM SCH MG: 80 INJECTION SUBCUTANEOUS at 20:24

## 2020-08-07 VITALS — SYSTOLIC BLOOD PRESSURE: 110 MMHG | DIASTOLIC BLOOD PRESSURE: 74 MMHG

## 2020-08-07 VITALS — SYSTOLIC BLOOD PRESSURE: 119 MMHG | DIASTOLIC BLOOD PRESSURE: 84 MMHG

## 2020-08-07 VITALS — SYSTOLIC BLOOD PRESSURE: 138 MMHG | DIASTOLIC BLOOD PRESSURE: 72 MMHG

## 2020-08-07 VITALS — SYSTOLIC BLOOD PRESSURE: 99 MMHG | DIASTOLIC BLOOD PRESSURE: 73 MMHG

## 2020-08-07 VITALS — SYSTOLIC BLOOD PRESSURE: 142 MMHG | DIASTOLIC BLOOD PRESSURE: 76 MMHG

## 2020-08-07 VITALS — DIASTOLIC BLOOD PRESSURE: 72 MMHG | SYSTOLIC BLOOD PRESSURE: 100 MMHG

## 2020-08-07 LAB
INR PPP: 3.18 (ref 0.93–1.1)
PROTHROMBIN TIME: 34.1 SECONDS (ref 9.6–11.5)

## 2020-08-07 RX ADMIN — Medication SCH NOTE: at 09:00

## 2020-08-07 RX ADMIN — DULOXETINE HYDROCHLORIDE SCH MG: 30 CAPSULE, DELAYED RELEASE ORAL at 09:03

## 2020-08-07 RX ADMIN — TRAZODONE HYDROCHLORIDE PRN MG: 50 TABLET ORAL at 22:26

## 2020-08-07 RX ADMIN — CARVEDILOL SCH MG: 3.12 TABLET, FILM COATED ORAL at 18:20

## 2020-08-07 RX ADMIN — GABAPENTIN PRN MG: 300 CAPSULE ORAL at 17:15

## 2020-08-07 RX ADMIN — CARVEDILOL SCH MG: 3.12 TABLET, FILM COATED ORAL at 05:09

## 2020-08-07 RX ADMIN — LISINOPRIL SCH MG: 5 TABLET ORAL at 09:03

## 2020-08-07 RX ADMIN — DIVALPROEX SODIUM SCH MG: 500 TABLET, DELAYED RELEASE ORAL at 19:59

## 2020-08-07 RX ADMIN — DIVALPROEX SODIUM SCH MG: 500 TABLET, DELAYED RELEASE ORAL at 09:03

## 2020-08-07 RX ADMIN — ENOXAPARIN SODIUM SCH MG: 80 INJECTION SUBCUTANEOUS at 19:59

## 2020-08-07 RX ADMIN — DOCUSATE SODIUM 50MG AND SENNOSIDES 8.6MG SCH TAB: 8.6; 5 TABLET, FILM COATED ORAL at 09:04

## 2020-08-07 RX ADMIN — OMEPRAZOLE SCH MG: 20 CAPSULE, DELAYED RELEASE ORAL at 09:03

## 2020-08-07 RX ADMIN — ENOXAPARIN SODIUM SCH MG: 80 INJECTION SUBCUTANEOUS at 09:04

## 2020-08-07 RX ADMIN — ATORVASTATIN CALCIUM SCH MG: 80 TABLET, FILM COATED ORAL at 19:59

## 2020-08-07 RX ADMIN — ASPIRIN SCH MG: 81 TABLET, COATED ORAL at 05:09

## 2020-08-08 VITALS — DIASTOLIC BLOOD PRESSURE: 72 MMHG | SYSTOLIC BLOOD PRESSURE: 112 MMHG

## 2020-08-08 VITALS — DIASTOLIC BLOOD PRESSURE: 81 MMHG | SYSTOLIC BLOOD PRESSURE: 114 MMHG

## 2020-08-08 VITALS — DIASTOLIC BLOOD PRESSURE: 68 MMHG | SYSTOLIC BLOOD PRESSURE: 103 MMHG

## 2020-08-08 LAB
ERYTHROCYTE [DISTWIDTH] IN BLOOD BY AUTOMATED COUNT: 13 % (ref 9.6–15.2)
INR PPP: 3.18 (ref 0.93–1.1)
MCH RBC QN AUTO: 30 PG (ref 27–34.8)
MCHC RBC AUTO-ENTMCNC: 33.1 G/DL (ref 32.4–35.8)
MCV RBC AUTO: 90.8 FL (ref 80–100)
PLATELET # BLD AUTO: 218 X10^3/UL (ref 130–400)
PMV BLD AUTO: 8.3 FL (ref 7.4–10.4)
PROTHROMBIN TIME: 33.1 SECONDS (ref 9.6–11.5)
RBC # BLD AUTO: 4.97 X10^6/UL (ref 3.82–5.3)

## 2020-08-08 RX ADMIN — CARVEDILOL SCH MG: 3.12 TABLET, FILM COATED ORAL at 17:07

## 2020-08-08 RX ADMIN — CARVEDILOL SCH MG: 3.12 TABLET, FILM COATED ORAL at 05:20

## 2020-08-08 RX ADMIN — DIVALPROEX SODIUM SCH MG: 500 TABLET, DELAYED RELEASE ORAL at 08:48

## 2020-08-08 RX ADMIN — DOCUSATE SODIUM 50MG AND SENNOSIDES 8.6MG SCH TAB: 8.6; 5 TABLET, FILM COATED ORAL at 08:49

## 2020-08-08 RX ADMIN — DULOXETINE HYDROCHLORIDE SCH MG: 30 CAPSULE, DELAYED RELEASE ORAL at 08:47

## 2020-08-08 RX ADMIN — OMEPRAZOLE SCH MG: 20 CAPSULE, DELAYED RELEASE ORAL at 08:48

## 2020-08-08 RX ADMIN — LISINOPRIL SCH MG: 5 TABLET ORAL at 08:48

## 2020-08-08 RX ADMIN — DOCUSATE SODIUM 50MG AND SENNOSIDES 8.6MG SCH TAB: 8.6; 5 TABLET, FILM COATED ORAL at 08:50

## 2020-08-08 RX ADMIN — ASPIRIN SCH MG: 81 TABLET, COATED ORAL at 05:19

## 2020-08-08 RX ADMIN — Medication SCH NOTE: at 09:00

## 2020-08-21 ENCOUNTER — HOSPITAL ENCOUNTER (EMERGENCY)
Dept: HOSPITAL 8 - ED | Age: 30
Discharge: HOME | End: 2020-08-21
Payer: MEDICAID

## 2020-08-21 VITALS — HEIGHT: 67 IN | BODY MASS INDEX: 45.99 KG/M2 | WEIGHT: 293 LBS

## 2020-08-21 VITALS — SYSTOLIC BLOOD PRESSURE: 113 MMHG | DIASTOLIC BLOOD PRESSURE: 60 MMHG

## 2020-08-21 DIAGNOSIS — I50.9: ICD-10-CM

## 2020-08-21 DIAGNOSIS — I11.0: ICD-10-CM

## 2020-08-21 DIAGNOSIS — I69.30: ICD-10-CM

## 2020-08-21 DIAGNOSIS — G43.B0: Primary | ICD-10-CM

## 2020-08-21 DIAGNOSIS — Z95.0: ICD-10-CM

## 2020-08-21 LAB
ALBUMIN SERPL-MCNC: 3 G/DL (ref 3.4–5)
ANION GAP SERPL CALC-SCNC: 3 MMOL/L (ref 5–15)
APTT BLD: 37 SECONDS (ref 25–31)
BASOPHILS # BLD AUTO: 0.03 X10^3/UL (ref 0–0.1)
BASOPHILS NFR BLD AUTO: 0 % (ref 0–1)
CALCIUM SERPL-MCNC: 8.9 MG/DL (ref 8.5–10.1)
CHLORIDE SERPL-SCNC: 107 MMOL/L (ref 98–107)
CREAT SERPL-MCNC: 0.92 MG/DL (ref 0.55–1.02)
EOSINOPHIL # BLD AUTO: 0.29 X10^3/UL (ref 0–0.4)
EOSINOPHIL NFR BLD AUTO: 3 % (ref 1–7)
ERYTHROCYTE [DISTWIDTH] IN BLOOD BY AUTOMATED COUNT: 13.2 % (ref 9.6–15.2)
INR PPP: 2.42 (ref 0.93–1.1)
LYMPHOCYTES # BLD AUTO: 1.74 X10^3/UL (ref 1–3.4)
LYMPHOCYTES NFR BLD AUTO: 19 % (ref 22–44)
MCH RBC QN AUTO: 29.9 PG (ref 27–34.8)
MCHC RBC AUTO-ENTMCNC: 32.5 G/DL (ref 32.4–35.8)
MCV RBC AUTO: 92 FL (ref 80–100)
MD: NO
MONOCYTES # BLD AUTO: 0.71 X10^3/UL (ref 0.2–0.8)
MONOCYTES NFR BLD AUTO: 8 % (ref 2–9)
NEUTROPHILS # BLD AUTO: 6.24 X10^3/UL (ref 1.8–6.8)
NEUTROPHILS NFR BLD AUTO: 69 % (ref 42–75)
PLATELET # BLD AUTO: 275 X10^3/UL (ref 130–400)
PMV BLD AUTO: 8.1 FL (ref 7.4–10.4)
PROTHROMBIN TIME: 25.1 SECONDS (ref 9.6–11.5)
RBC # BLD AUTO: 4.87 X10^6/UL (ref 3.82–5.3)

## 2020-08-21 PROCEDURE — 70450 CT HEAD/BRAIN W/O DYE: CPT

## 2020-08-21 PROCEDURE — 99285 EMERGENCY DEPT VISIT HI MDM: CPT

## 2020-08-21 PROCEDURE — 96374 THER/PROPH/DIAG INJ IV PUSH: CPT

## 2020-08-21 PROCEDURE — 85025 COMPLETE CBC W/AUTO DIFF WBC: CPT

## 2020-08-21 PROCEDURE — 96361 HYDRATE IV INFUSION ADD-ON: CPT

## 2020-08-21 PROCEDURE — 82040 ASSAY OF SERUM ALBUMIN: CPT

## 2020-08-21 PROCEDURE — 85730 THROMBOPLASTIN TIME PARTIAL: CPT

## 2020-08-21 PROCEDURE — 80048 BASIC METABOLIC PNL TOTAL CA: CPT

## 2020-08-21 PROCEDURE — 85610 PROTHROMBIN TIME: CPT

## 2020-08-21 PROCEDURE — 96375 TX/PRO/DX INJ NEW DRUG ADDON: CPT

## 2020-08-21 PROCEDURE — 36415 COLL VENOUS BLD VENIPUNCTURE: CPT

## 2020-08-21 NOTE — NUR
Pt here for HA. Pt states pressure is behind R eye, suffering from photophobia, 
some blurry vision. BP WNL. Pt has HX of CVA. No motor/neuro deficits noted. 
Able to ambulate well.

## 2020-10-06 ENCOUNTER — HOSPITAL ENCOUNTER (EMERGENCY)
Dept: HOSPITAL 8 - ED | Age: 30
Discharge: HOME | End: 2020-10-06
Payer: COMMERCIAL

## 2020-10-06 VITALS — WEIGHT: 293 LBS | BODY MASS INDEX: 45.99 KG/M2 | HEIGHT: 67 IN

## 2020-10-06 VITALS — DIASTOLIC BLOOD PRESSURE: 82 MMHG | SYSTOLIC BLOOD PRESSURE: 155 MMHG

## 2020-10-06 DIAGNOSIS — Z86.73: ICD-10-CM
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
## 2020-10-06 DIAGNOSIS — Z95.0: ICD-10-CM

## 2020-10-06 DIAGNOSIS — G89.11: Primary | ICD-10-CM
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
## 2020-10-06 DIAGNOSIS — F17.290: ICD-10-CM

## 2020-10-06 DIAGNOSIS — R10.84: ICD-10-CM

## 2020-10-06 DIAGNOSIS — I11.0: ICD-10-CM

## 2020-10-06 DIAGNOSIS — I50.9: ICD-10-CM
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
## 2020-10-06 DIAGNOSIS — R10.2: ICD-10-CM

## 2020-10-06 LAB
ALBUMIN SERPL-MCNC: 3.5 G/DL (ref 3.4–5)
ALP SERPL-CCNC: 73 U/L (ref 45–117)
ALT SERPL-CCNC: 39 U/L (ref 12–78)
ANION GAP SERPL CALC-SCNC: 6 MMOL/L (ref 5–15)
APTT BLD: 45 SECONDS (ref 25–31)
BASOPHILS # BLD AUTO: 0.1 X10^3/UL (ref 0–0.1)
BASOPHILS NFR BLD AUTO: 1 % (ref 0–1)
BILIRUB SERPL-MCNC: 0.8 MG/DL (ref 0.2–1)
CALCIUM SERPL-MCNC: 8.7 MG/DL (ref 8.5–10.1)
CHLORIDE SERPL-SCNC: 105 MMOL/L (ref 98–107)
CREAT SERPL-MCNC: 0.91 MG/DL (ref 0.55–1.02)
EOSINOPHIL # BLD AUTO: 0.6 X10^3/UL (ref 0–0.4)
EOSINOPHIL NFR BLD AUTO: 6 % (ref 1–7)
ERYTHROCYTE [DISTWIDTH] IN BLOOD BY AUTOMATED COUNT: 13.5 % (ref 9.6–15.2)
INR PPP: 3.92 (ref 0.93–1.1)
LYMPHOCYTES # BLD AUTO: 2.3 X10^3/UL (ref 1–3.4)
LYMPHOCYTES NFR BLD AUTO: 26 % (ref 22–44)
MCH RBC QN AUTO: 30.2 PG (ref 27–34.8)
MCHC RBC AUTO-ENTMCNC: 33.1 G/DL (ref 32.4–35.8)
MD: NO
MICROSCOPIC: (no result)
MONOCYTES # BLD AUTO: 0.7 X10^3/UL (ref 0.2–0.8)
MONOCYTES NFR BLD AUTO: 8 % (ref 2–9)
NEUTROPHILS # BLD AUTO: 5.2 X10^3/UL (ref 1.8–6.8)
NEUTROPHILS NFR BLD AUTO: 59 % (ref 42–75)
PLATELET # BLD AUTO: 295 X10^3/UL (ref 130–400)
PMV BLD AUTO: 8.5 FL (ref 7.4–10.4)
PROT SERPL-MCNC: 7.7 G/DL (ref 6.4–8.2)
PROTHROMBIN TIME: 40.9 SECONDS (ref 9.6–11.5)
RBC # BLD AUTO: 5.11 X10^6/UL (ref 3.82–5.3)

## 2020-10-06 PROCEDURE — 85025 COMPLETE CBC W/AUTO DIFF WBC: CPT

## 2020-10-06 PROCEDURE — 99406 BEHAV CHNG SMOKING 3-10 MIN: CPT

## 2020-10-06 PROCEDURE — 99285 EMERGENCY DEPT VISIT HI MDM: CPT

## 2020-10-06 PROCEDURE — 93005 ELECTROCARDIOGRAM TRACING: CPT

## 2020-10-06 PROCEDURE — 80053 COMPREHEN METABOLIC PANEL: CPT

## 2020-10-06 PROCEDURE — 85610 PROTHROMBIN TIME: CPT

## 2020-10-06 PROCEDURE — 84703 CHORIONIC GONADOTROPIN ASSAY: CPT

## 2020-10-06 PROCEDURE — 85730 THROMBOPLASTIN TIME PARTIAL: CPT

## 2020-10-06 PROCEDURE — 36415 COLL VENOUS BLD VENIPUNCTURE: CPT

## 2020-10-06 PROCEDURE — 74177 CT ABD & PELVIS W/CONTRAST: CPT

## 2020-10-06 PROCEDURE — 81003 URINALYSIS AUTO W/O SCOPE: CPT

## 2020-10-06 NOTE — NUR
Pt reports she fell on her buttock when walking. She reports getting up and 
then started having alot of pain and not feeling well. Pt reports she has not 
seen any bruising in her areas. Pt is concerned because she is on coumadin for 
her mechanical heart valve. Pt is able to sit on her bottom. Pt resting in room 
and was able to ambulate about 200 feet with no distress.

## 2020-11-05 ENCOUNTER — HOSPITAL ENCOUNTER (INPATIENT)
Dept: HOSPITAL 8 - ED | Age: 30
LOS: 4 days | Discharge: HOME | DRG: 313 | End: 2020-11-09
Attending: FAMILY MEDICINE | Admitting: FAMILY MEDICINE
Payer: COMMERCIAL

## 2020-11-05 VITALS — DIASTOLIC BLOOD PRESSURE: 86 MMHG | SYSTOLIC BLOOD PRESSURE: 123 MMHG

## 2020-11-05 VITALS — WEIGHT: 293 LBS | BODY MASS INDEX: 45.99 KG/M2 | HEIGHT: 67 IN

## 2020-11-05 DIAGNOSIS — F33.2: ICD-10-CM

## 2020-11-05 DIAGNOSIS — R45.851: ICD-10-CM

## 2020-11-05 DIAGNOSIS — R55: ICD-10-CM

## 2020-11-05 DIAGNOSIS — E66.9: ICD-10-CM

## 2020-11-05 DIAGNOSIS — Z83.3: ICD-10-CM

## 2020-11-05 DIAGNOSIS — R07.89: Primary | ICD-10-CM

## 2020-11-05 DIAGNOSIS — I50.9: ICD-10-CM

## 2020-11-05 DIAGNOSIS — I11.0: ICD-10-CM

## 2020-11-05 DIAGNOSIS — F41.0: ICD-10-CM

## 2020-11-05 DIAGNOSIS — F29: ICD-10-CM

## 2020-11-05 DIAGNOSIS — Z79.01: ICD-10-CM

## 2020-11-05 DIAGNOSIS — F12.90: ICD-10-CM

## 2020-11-05 DIAGNOSIS — Z86.73: ICD-10-CM

## 2020-11-05 DIAGNOSIS — R10.13: ICD-10-CM

## 2020-11-05 DIAGNOSIS — Z95.2: ICD-10-CM

## 2020-11-05 DIAGNOSIS — Z95.0: ICD-10-CM

## 2020-11-05 LAB
ALBUMIN SERPL-MCNC: 3.5 G/DL (ref 3.4–5)
ALP SERPL-CCNC: 60 U/L (ref 45–117)
ALT SERPL-CCNC: 31 U/L (ref 12–78)
ANION GAP SERPL CALC-SCNC: 5 MMOL/L (ref 5–15)
BASOPHILS # BLD AUTO: 0.1 X10^3/UL (ref 0–0.1)
BASOPHILS NFR BLD AUTO: 1 % (ref 0–1)
BILIRUB SERPL-MCNC: 0.8 MG/DL (ref 0.2–1)
CALCIUM SERPL-MCNC: 9 MG/DL (ref 8.5–10.1)
CHLORIDE SERPL-SCNC: 109 MMOL/L (ref 98–107)
CREAT SERPL-MCNC: 0.96 MG/DL (ref 0.55–1.02)
EOSINOPHIL # BLD AUTO: 0 X10^3/UL (ref 0–0.4)
EOSINOPHIL NFR BLD AUTO: 0 % (ref 1–7)
ERYTHROCYTE [DISTWIDTH] IN BLOOD BY AUTOMATED COUNT: 13.7 % (ref 9.6–15.2)
INR PPP: 3.32 (ref 0.93–1.1)
LYMPHOCYTES # BLD AUTO: 1.4 X10^3/UL (ref 1–3.4)
LYMPHOCYTES NFR BLD AUTO: 18 % (ref 22–44)
MCH RBC QN AUTO: 29.8 PG (ref 27–34.8)
MCHC RBC AUTO-ENTMCNC: 33 G/DL (ref 32.4–35.8)
MD: NO
MONOCYTES # BLD AUTO: 0.6 X10^3/UL (ref 0.2–0.8)
MONOCYTES NFR BLD AUTO: 8 % (ref 2–9)
NEUTROPHILS # BLD AUTO: 5.9 X10^3/UL (ref 1.8–6.8)
NEUTROPHILS NFR BLD AUTO: 73 % (ref 42–75)
PLATELET # BLD AUTO: 279 X10^3/UL (ref 130–400)
PMV BLD AUTO: 8.3 FL (ref 7.4–10.4)
PROT SERPL-MCNC: 7.5 G/DL (ref 6.4–8.2)
PROTHROMBIN TIME: 34.8 SECONDS (ref 9.6–11.5)
RBC # BLD AUTO: 5.07 X10^6/UL (ref 3.82–5.3)
TROPONIN I SERPL-MCNC: < 0.015 NG/ML (ref 0–0.04)
TROPONIN I SERPL-MCNC: < 0.015 NG/ML (ref 0–0.04)

## 2020-11-05 PROCEDURE — 85610 PROTHROMBIN TIME: CPT

## 2020-11-05 PROCEDURE — 36415 COLL VENOUS BLD VENIPUNCTURE: CPT

## 2020-11-05 PROCEDURE — 93306 TTE W/DOPPLER COMPLETE: CPT

## 2020-11-05 PROCEDURE — 84484 ASSAY OF TROPONIN QUANT: CPT

## 2020-11-05 PROCEDURE — 93005 ELECTROCARDIOGRAM TRACING: CPT

## 2020-11-05 PROCEDURE — 85018 HEMOGLOBIN: CPT

## 2020-11-05 PROCEDURE — 85014 HEMATOCRIT: CPT

## 2020-11-05 PROCEDURE — 71045 X-RAY EXAM CHEST 1 VIEW: CPT

## 2020-11-05 PROCEDURE — 70450 CT HEAD/BRAIN W/O DYE: CPT

## 2020-11-05 PROCEDURE — 80047 BASIC METABLC PNL IONIZED CA: CPT

## 2020-11-05 PROCEDURE — 80053 COMPREHEN METABOLIC PANEL: CPT

## 2020-11-05 PROCEDURE — 90686 IIV4 VACC NO PRSV 0.5 ML IM: CPT

## 2020-11-05 PROCEDURE — 85025 COMPLETE CBC W/AUTO DIFF WBC: CPT

## 2020-11-05 RX ADMIN — MORPHINE SULFATE PRN MG: 4 INJECTION INTRAVENOUS at 17:40

## 2020-11-05 RX ADMIN — MORPHINE SULFATE PRN MG: 4 INJECTION INTRAVENOUS at 18:04

## 2020-11-05 RX ADMIN — ATORVASTATIN CALCIUM SCH MG: 80 TABLET, FILM COATED ORAL at 23:36

## 2020-11-05 RX ADMIN — BUSPIRONE HYDROCHLORIDE SCH MG: 5 TABLET ORAL at 23:36

## 2020-11-05 NOTE — NUR
Code Cardiac called @ 1726

Cardiology paged @ 1720

Cath lab called @ 1726

Dr. Carlson called back @ 1733

## 2020-11-05 NOTE — NUR
BREAK RN: PT ON CELL PHONE IN ROOM. VS STABLE. CARDIAC MONITOR ON. CALL LIGHT 
IN PLACE WILL COTINUE TO MONITOR WHILE PRIMARY RN IS ON BREAK.

## 2020-11-06 VITALS — SYSTOLIC BLOOD PRESSURE: 111 MMHG | DIASTOLIC BLOOD PRESSURE: 74 MMHG

## 2020-11-06 VITALS — DIASTOLIC BLOOD PRESSURE: 79 MMHG | SYSTOLIC BLOOD PRESSURE: 118 MMHG

## 2020-11-06 VITALS — DIASTOLIC BLOOD PRESSURE: 76 MMHG | SYSTOLIC BLOOD PRESSURE: 109 MMHG

## 2020-11-06 VITALS — SYSTOLIC BLOOD PRESSURE: 120 MMHG | DIASTOLIC BLOOD PRESSURE: 85 MMHG

## 2020-11-06 VITALS — SYSTOLIC BLOOD PRESSURE: 118 MMHG | DIASTOLIC BLOOD PRESSURE: 79 MMHG

## 2020-11-06 VITALS — DIASTOLIC BLOOD PRESSURE: 85 MMHG | SYSTOLIC BLOOD PRESSURE: 139 MMHG

## 2020-11-06 VITALS — DIASTOLIC BLOOD PRESSURE: 89 MMHG | SYSTOLIC BLOOD PRESSURE: 128 MMHG

## 2020-11-06 VITALS — SYSTOLIC BLOOD PRESSURE: 103 MMHG | DIASTOLIC BLOOD PRESSURE: 63 MMHG

## 2020-11-06 VITALS — SYSTOLIC BLOOD PRESSURE: 110 MMHG | DIASTOLIC BLOOD PRESSURE: 60 MMHG

## 2020-11-06 VITALS — DIASTOLIC BLOOD PRESSURE: 81 MMHG | SYSTOLIC BLOOD PRESSURE: 127 MMHG

## 2020-11-06 LAB
INR PPP: 3.08 (ref 0.93–1.1)
PROTHROMBIN TIME: 32.3 SECONDS (ref 9.6–11.5)

## 2020-11-06 RX ADMIN — CARVEDILOL SCH MG: 3.12 TABLET, FILM COATED ORAL at 18:02

## 2020-11-06 RX ADMIN — DULOXETINE HYDROCHLORIDE SCH MG: 30 CAPSULE, DELAYED RELEASE ORAL at 09:19

## 2020-11-06 RX ADMIN — ASPIRIN SCH MG: 81 TABLET, COATED ORAL at 06:13

## 2020-11-06 RX ADMIN — OMEPRAZOLE SCH MG: 20 CAPSULE, DELAYED RELEASE ORAL at 09:20

## 2020-11-06 RX ADMIN — LISINOPRIL SCH MG: 5 TABLET ORAL at 09:20

## 2020-11-06 RX ADMIN — BUSPIRONE HYDROCHLORIDE SCH MG: 5 TABLET ORAL at 20:11

## 2020-11-06 RX ADMIN — BUSPIRONE HYDROCHLORIDE SCH MG: 5 TABLET ORAL at 09:20

## 2020-11-06 RX ADMIN — CARVEDILOL SCH MG: 3.12 TABLET, FILM COATED ORAL at 06:13

## 2020-11-06 RX ADMIN — DULOXETINE HYDROCHLORIDE SCH MG: 20 CAPSULE, DELAYED RELEASE ORAL at 09:20

## 2020-11-06 RX ADMIN — ATORVASTATIN CALCIUM SCH MG: 80 TABLET, FILM COATED ORAL at 20:11

## 2020-11-06 NOTE — NUR
GRETA LEE Fall Risk Medication(s) present and receiving anticoagulants.

-------------------------------------------------------------------------------

Signed:    11/06/20 at 1742 by SHITAL BUTTS

-------------------------------------------------------------------------------

## 2020-11-07 VITALS — SYSTOLIC BLOOD PRESSURE: 90 MMHG | DIASTOLIC BLOOD PRESSURE: 62 MMHG

## 2020-11-07 VITALS — SYSTOLIC BLOOD PRESSURE: 97 MMHG | DIASTOLIC BLOOD PRESSURE: 67 MMHG

## 2020-11-07 VITALS — DIASTOLIC BLOOD PRESSURE: 68 MMHG | SYSTOLIC BLOOD PRESSURE: 105 MMHG

## 2020-11-07 VITALS — SYSTOLIC BLOOD PRESSURE: 123 MMHG | DIASTOLIC BLOOD PRESSURE: 85 MMHG

## 2020-11-07 VITALS — DIASTOLIC BLOOD PRESSURE: 67 MMHG | SYSTOLIC BLOOD PRESSURE: 100 MMHG

## 2020-11-07 VITALS — SYSTOLIC BLOOD PRESSURE: 96 MMHG | DIASTOLIC BLOOD PRESSURE: 64 MMHG

## 2020-11-07 LAB
INR PPP: 3.84 (ref 0.93–1.1)
PROTHROMBIN TIME: 40.2 SECONDS (ref 9.6–11.5)

## 2020-11-07 RX ADMIN — CARVEDILOL SCH MG: 3.12 TABLET, FILM COATED ORAL at 05:54

## 2020-11-07 RX ADMIN — ACETAMINOPHEN PRN MG: 325 TABLET, FILM COATED ORAL at 12:56

## 2020-11-07 RX ADMIN — DULOXETINE HYDROCHLORIDE SCH MG: 30 CAPSULE, DELAYED RELEASE ORAL at 09:37

## 2020-11-07 RX ADMIN — BUSPIRONE HYDROCHLORIDE SCH MG: 5 TABLET ORAL at 09:37

## 2020-11-07 RX ADMIN — ONDANSETRON HYDROCHLORIDE PRN MG: 4 TABLET, FILM COATED ORAL at 20:13

## 2020-11-07 RX ADMIN — DULOXETINE HYDROCHLORIDE SCH MG: 20 CAPSULE, DELAYED RELEASE ORAL at 09:37

## 2020-11-07 RX ADMIN — ATORVASTATIN CALCIUM SCH MG: 80 TABLET, FILM COATED ORAL at 20:13

## 2020-11-07 RX ADMIN — CARVEDILOL SCH MG: 3.12 TABLET, FILM COATED ORAL at 17:28

## 2020-11-07 RX ADMIN — BUSPIRONE HYDROCHLORIDE SCH MG: 5 TABLET ORAL at 20:13

## 2020-11-07 RX ADMIN — OMEPRAZOLE SCH MG: 20 CAPSULE, DELAYED RELEASE ORAL at 05:54

## 2020-11-07 RX ADMIN — LISINOPRIL SCH MG: 5 TABLET ORAL at 08:51

## 2020-11-07 RX ADMIN — ASPIRIN SCH MG: 81 TABLET, COATED ORAL at 09:37

## 2020-11-08 VITALS — DIASTOLIC BLOOD PRESSURE: 74 MMHG | SYSTOLIC BLOOD PRESSURE: 106 MMHG

## 2020-11-08 VITALS — DIASTOLIC BLOOD PRESSURE: 73 MMHG | SYSTOLIC BLOOD PRESSURE: 104 MMHG

## 2020-11-08 VITALS — DIASTOLIC BLOOD PRESSURE: 63 MMHG | SYSTOLIC BLOOD PRESSURE: 92 MMHG

## 2020-11-08 VITALS — DIASTOLIC BLOOD PRESSURE: 79 MMHG | SYSTOLIC BLOOD PRESSURE: 115 MMHG

## 2020-11-08 VITALS — SYSTOLIC BLOOD PRESSURE: 95 MMHG | DIASTOLIC BLOOD PRESSURE: 63 MMHG

## 2020-11-08 LAB
INR PPP: 4.29 (ref 0.93–1.1)
PROTHROMBIN TIME: 44.8 SECONDS (ref 9.6–11.5)

## 2020-11-08 RX ADMIN — OMEPRAZOLE SCH MG: 20 CAPSULE, DELAYED RELEASE ORAL at 07:53

## 2020-11-08 RX ADMIN — DULOXETINE HYDROCHLORIDE SCH MG: 20 CAPSULE, DELAYED RELEASE ORAL at 07:53

## 2020-11-08 RX ADMIN — LISINOPRIL SCH MG: 5 TABLET ORAL at 07:45

## 2020-11-08 RX ADMIN — CARVEDILOL SCH MG: 3.12 TABLET, FILM COATED ORAL at 18:00

## 2020-11-08 RX ADMIN — ASPIRIN SCH MG: 81 TABLET, COATED ORAL at 05:27

## 2020-11-08 RX ADMIN — ATORVASTATIN CALCIUM SCH MG: 80 TABLET, FILM COATED ORAL at 20:26

## 2020-11-08 RX ADMIN — BUSPIRONE HYDROCHLORIDE SCH MG: 5 TABLET ORAL at 07:53

## 2020-11-08 RX ADMIN — CARVEDILOL SCH MG: 3.12 TABLET, FILM COATED ORAL at 05:27

## 2020-11-08 RX ADMIN — ONDANSETRON HYDROCHLORIDE PRN MG: 4 TABLET, FILM COATED ORAL at 20:26

## 2020-11-08 RX ADMIN — ACETAMINOPHEN PRN MG: 325 TABLET, FILM COATED ORAL at 07:55

## 2020-11-08 RX ADMIN — BUSPIRONE HYDROCHLORIDE SCH MG: 5 TABLET ORAL at 20:26

## 2020-11-08 RX ADMIN — DULOXETINE HYDROCHLORIDE SCH MG: 30 CAPSULE, DELAYED RELEASE ORAL at 07:55

## 2020-11-09 VITALS — SYSTOLIC BLOOD PRESSURE: 96 MMHG | DIASTOLIC BLOOD PRESSURE: 66 MMHG

## 2020-11-09 VITALS — SYSTOLIC BLOOD PRESSURE: 107 MMHG | DIASTOLIC BLOOD PRESSURE: 72 MMHG

## 2020-11-09 VITALS — SYSTOLIC BLOOD PRESSURE: 90 MMHG | DIASTOLIC BLOOD PRESSURE: 59 MMHG

## 2020-11-09 VITALS — SYSTOLIC BLOOD PRESSURE: 100 MMHG | DIASTOLIC BLOOD PRESSURE: 68 MMHG

## 2020-11-09 LAB
INR PPP: 2.88 (ref 0.93–1.1)
PROTHROMBIN TIME: 30.2 SECONDS (ref 9.6–11.5)

## 2020-11-09 RX ADMIN — DULOXETINE HYDROCHLORIDE SCH MG: 20 CAPSULE, DELAYED RELEASE ORAL at 08:02

## 2020-11-09 RX ADMIN — ONDANSETRON HYDROCHLORIDE PRN MG: 4 TABLET, FILM COATED ORAL at 03:30

## 2020-11-09 RX ADMIN — LISINOPRIL SCH MG: 5 TABLET ORAL at 08:03

## 2020-11-09 RX ADMIN — CARVEDILOL SCH MG: 3.12 TABLET, FILM COATED ORAL at 05:53

## 2020-11-09 RX ADMIN — ACETAMINOPHEN PRN MG: 325 TABLET, FILM COATED ORAL at 08:02

## 2020-11-09 RX ADMIN — OMEPRAZOLE SCH MG: 20 CAPSULE, DELAYED RELEASE ORAL at 08:02

## 2020-11-09 RX ADMIN — ASPIRIN SCH MG: 81 TABLET, COATED ORAL at 05:53

## 2020-11-09 RX ADMIN — DULOXETINE HYDROCHLORIDE SCH MG: 30 CAPSULE, DELAYED RELEASE ORAL at 08:02

## 2020-11-09 RX ADMIN — BUSPIRONE HYDROCHLORIDE SCH MG: 5 TABLET ORAL at 08:02

## 2020-12-04 ENCOUNTER — HOSPITAL ENCOUNTER (EMERGENCY)
Dept: HOSPITAL 8 - ED | Age: 30
Discharge: HOME | End: 2020-12-04
Payer: MEDICAID

## 2020-12-04 VITALS — WEIGHT: 293 LBS | HEIGHT: 67 IN | BODY MASS INDEX: 45.99 KG/M2

## 2020-12-04 VITALS — SYSTOLIC BLOOD PRESSURE: 121 MMHG | DIASTOLIC BLOOD PRESSURE: 73 MMHG

## 2020-12-04 DIAGNOSIS — G43.909: ICD-10-CM

## 2020-12-04 DIAGNOSIS — I11.0: ICD-10-CM
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
## 2020-12-04 DIAGNOSIS — I50.9: ICD-10-CM

## 2020-12-04 DIAGNOSIS — S80.02XA: Primary | ICD-10-CM

## 2020-12-04 DIAGNOSIS — Z95.0: ICD-10-CM

## 2020-12-04 DIAGNOSIS — S09.90XA: ICD-10-CM

## 2020-12-04 DIAGNOSIS — Z86.73: ICD-10-CM
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
## 2020-12-04 PROCEDURE — 99283 EMERGENCY DEPT VISIT LOW MDM: CPT

## 2020-12-11 ENCOUNTER — HOSPITAL ENCOUNTER (EMERGENCY)
Dept: HOSPITAL 8 - ED | Age: 30
End: 2020-12-11
Payer: MEDICAID

## 2020-12-11 VITALS — BODY MASS INDEX: 45.99 KG/M2 | HEIGHT: 67 IN | WEIGHT: 293 LBS

## 2020-12-11 VITALS — SYSTOLIC BLOOD PRESSURE: 149 MMHG | DIASTOLIC BLOOD PRESSURE: 95 MMHG

## 2020-12-11 DIAGNOSIS — Z79.01: ICD-10-CM

## 2020-12-11 DIAGNOSIS — Z59.0: ICD-10-CM

## 2020-12-11 DIAGNOSIS — I50.9: ICD-10-CM

## 2020-12-11 DIAGNOSIS — S63.283A: Primary | ICD-10-CM

## 2020-12-11 DIAGNOSIS — Z86.73: ICD-10-CM
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
## 2020-12-11 DIAGNOSIS — I11.0: ICD-10-CM
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
## 2020-12-11 PROCEDURE — 26700 TREAT KNUCKLE DISLOCATION: CPT

## 2020-12-11 PROCEDURE — 99284 EMERGENCY DEPT VISIT MOD MDM: CPT

## 2020-12-11 SDOH — ECONOMIC STABILITY - HOUSING INSECURITY: HOMELESSNESS: Z59.0

## 2021-03-12 ENCOUNTER — HOSPITAL ENCOUNTER (EMERGENCY)
Dept: HOSPITAL 8 - ED | Age: 31
Discharge: HOME | End: 2021-03-12
Payer: MEDICAID

## 2021-03-12 VITALS — HEIGHT: 67 IN | WEIGHT: 293 LBS | BODY MASS INDEX: 45.99 KG/M2

## 2021-03-12 VITALS — DIASTOLIC BLOOD PRESSURE: 68 MMHG | SYSTOLIC BLOOD PRESSURE: 117 MMHG

## 2021-03-12 DIAGNOSIS — R07.2: Primary | ICD-10-CM

## 2021-03-12 DIAGNOSIS — I50.9: ICD-10-CM

## 2021-03-12 DIAGNOSIS — Z86.73: ICD-10-CM

## 2021-03-12 DIAGNOSIS — Z95.0: ICD-10-CM

## 2021-03-12 DIAGNOSIS — I11.0: ICD-10-CM

## 2021-03-12 LAB
ALBUMIN SERPL-MCNC: 3.1 G/DL (ref 3.4–5)
ANION GAP SERPL CALC-SCNC: 8 MMOL/L (ref 5–15)
BASOPHILS # BLD AUTO: 0.1 X10^3/UL (ref 0–0.1)
BASOPHILS NFR BLD AUTO: 1 % (ref 0–1)
CALCIUM SERPL-MCNC: 8.3 MG/DL (ref 8.5–10.1)
CHLORIDE SERPL-SCNC: 106 MMOL/L (ref 98–107)
CREAT SERPL-MCNC: 0.75 MG/DL (ref 0.55–1.02)
EOSINOPHIL # BLD AUTO: 0.1 X10^3/UL (ref 0–0.4)
EOSINOPHIL NFR BLD AUTO: 2 % (ref 1–7)
ERYTHROCYTE [DISTWIDTH] IN BLOOD BY AUTOMATED COUNT: 13.2 % (ref 9.6–15.2)
INR PPP: 1.53 (ref 0.93–1.1)
LYMPHOCYTES # BLD AUTO: 1.2 X10^3/UL (ref 1–3.4)
LYMPHOCYTES NFR BLD AUTO: 22 % (ref 22–44)
MCH RBC QN AUTO: 31.3 PG (ref 27–34.8)
MCHC RBC AUTO-ENTMCNC: 34.6 G/DL (ref 32.4–35.8)
MD: NO
MONOCYTES # BLD AUTO: 1 X10^3/UL (ref 0.2–0.8)
MONOCYTES NFR BLD AUTO: 18 % (ref 2–9)
NEUTROPHILS # BLD AUTO: 3.1 X10^3/UL (ref 1.8–6.8)
NEUTROPHILS NFR BLD AUTO: 58 % (ref 42–75)
PLATELET # BLD AUTO: 197 X10^3/UL (ref 130–400)
PMV BLD AUTO: 8.6 FL (ref 7.4–10.4)
PROTHROMBIN TIME: 16.2 SECONDS (ref 9.6–11.5)
RBC # BLD AUTO: 4.84 X10^6/UL (ref 3.82–5.3)
TROPONIN I SERPL-MCNC: < 0.015 NG/ML (ref 0–0.04)

## 2021-03-12 PROCEDURE — 80048 BASIC METABOLIC PNL TOTAL CA: CPT

## 2021-03-12 PROCEDURE — 85025 COMPLETE CBC W/AUTO DIFF WBC: CPT

## 2021-03-12 PROCEDURE — 99285 EMERGENCY DEPT VISIT HI MDM: CPT

## 2021-03-12 PROCEDURE — 84484 ASSAY OF TROPONIN QUANT: CPT

## 2021-03-12 PROCEDURE — 85610 PROTHROMBIN TIME: CPT

## 2021-03-12 PROCEDURE — 82040 ASSAY OF SERUM ALBUMIN: CPT

## 2021-03-12 PROCEDURE — 36415 COLL VENOUS BLD VENIPUNCTURE: CPT

## 2021-03-12 PROCEDURE — 93005 ELECTROCARDIOGRAM TRACING: CPT

## 2021-03-12 PROCEDURE — 71045 X-RAY EXAM CHEST 1 VIEW: CPT

## 2021-03-12 NOTE — NUR
PATIENT BIB EMS WITH CHIEF C/O CHEST PAIN/PRESSURE X2 DAYS.  PER EMS PAIN 
RADIATES TO UPPER BACK, AND HAS GOTTEN PROGRESSIVELY WORSE, PAIN WOKE PATIENT 
UP THIS MORNING.PATIENT REPORTS PAIN IS ON THE LEFT SIDE OF HER CHEST AND LEFT 
UPPER BACK.  20 GAUGE IV STARTED R-AC EN ROUTE,  ASA GIVEN BY EMS PRIOR 
TO ARRIVAL.  VSS EN ROUTE.



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  
PATIENT TAKES 10 MG COUMADIN, UNSURE WHAT LAST INR WAS.  



UPON ASSESSMENT PATIENT C/O 8/10 LEFT CHEST PAIN THAT RADIATES TO HER UPPER 
LEFT BACK, MADE WORSE WITH DEEP BREATHING.  98% ON RA, PACED IN THE 80'S ON THE 
MONITOR, NADN, CALL LIGHT WITHIN REACH.

## 2021-03-12 NOTE — NUR
IV removed with tip intact. Patient given discharge instructions and 
prescription and they have confirmed that they understand the instructions.  
Patient stable and ambulatory with steady gait from ED to private vehicle.

## 2021-03-12 NOTE — NUR
TASK RN NOTE: PT RESTING ON GURNEY, A&O, RESPS EVEN AND UNLABORED. PT NOTES 
CHEST PAIN LEVEL IS 8/10, UNCHANGED SINCE ARRIVAL. PT IS NSR ON CARDIAC MONITOR 
WITH NO ECTOPY, RATE 70'S. CHART UP FOR RECHECK, AWAITING MD AND DISPO.

## 2021-03-12 NOTE — NUR
PATIENT SITTING IN [facility] ON PHONE, NADN, VSS, CALL LIGHT WITHIN REACH, NO 
FURTHER NEEDS AT THIS TIME.  



WAITING FOR LAB AND IMAGING RESULTS.

## 2021-07-12 ENCOUNTER — HOSPITAL ENCOUNTER (EMERGENCY)
Dept: HOSPITAL 8 - ED | Age: 31
Discharge: HOME | End: 2021-07-12
Payer: MEDICAID

## 2021-07-12 VITALS — BODY MASS INDEX: 45.99 KG/M2 | HEIGHT: 67 IN | WEIGHT: 293 LBS

## 2021-07-12 VITALS — SYSTOLIC BLOOD PRESSURE: 122 MMHG | DIASTOLIC BLOOD PRESSURE: 87 MMHG

## 2021-07-12 DIAGNOSIS — G43.909: ICD-10-CM

## 2021-07-12 DIAGNOSIS — I44.7: ICD-10-CM

## 2021-07-12 DIAGNOSIS — Z86.73: ICD-10-CM

## 2021-07-12 DIAGNOSIS — I50.9: ICD-10-CM

## 2021-07-12 DIAGNOSIS — R07.89: Primary | ICD-10-CM

## 2021-07-12 DIAGNOSIS — I11.0: ICD-10-CM

## 2021-07-12 DIAGNOSIS — R11.2: ICD-10-CM

## 2021-07-12 LAB
ALBUMIN SERPL-MCNC: 3.2 G/DL (ref 3.4–5)
ALP SERPL-CCNC: 61 U/L (ref 45–117)
ALT SERPL-CCNC: 37 U/L (ref 12–78)
ANION GAP SERPL CALC-SCNC: 3 MMOL/L (ref 5–15)
BASOPHILS # BLD AUTO: 0.1 X10^3/UL (ref 0–0.1)
BASOPHILS NFR BLD AUTO: 1 % (ref 0–1)
BILIRUB SERPL-MCNC: 1.1 MG/DL (ref 0.2–1)
CALCIUM SERPL-MCNC: 9.1 MG/DL (ref 8.5–10.1)
CHLORIDE SERPL-SCNC: 108 MMOL/L (ref 98–107)
CREAT SERPL-MCNC: 0.79 MG/DL (ref 0.55–1.02)
D DIMER PPP FEU-MCNC: 0.38 UG/MLFEU (ref 0–0.52)
EOSINOPHIL # BLD AUTO: 0 X10^3/UL (ref 0–0.4)
EOSINOPHIL NFR BLD AUTO: 0 % (ref 1–7)
ERYTHROCYTE [DISTWIDTH] IN BLOOD BY AUTOMATED COUNT: 13 % (ref 9.6–15.2)
INR PPP: 1.25 (ref 0.93–1.1)
LYMPHOCYTES # BLD AUTO: 1.3 X10^3/UL (ref 1–3.4)
LYMPHOCYTES NFR BLD AUTO: 13 % (ref 22–44)
MCH RBC QN AUTO: 30.8 PG (ref 27–34.8)
MCHC RBC AUTO-ENTMCNC: 34.2 G/DL (ref 32.4–35.8)
MICROSCOPIC: (no result)
MONOCYTES # BLD AUTO: 0.7 X10^3/UL (ref 0.2–0.8)
MONOCYTES NFR BLD AUTO: 6 % (ref 2–9)
NEUTROPHILS # BLD AUTO: 8.5 X10^3/UL (ref 1.8–6.8)
NEUTROPHILS NFR BLD AUTO: 80 % (ref 42–75)
PLATELET # BLD AUTO: 273 X10^3/UL (ref 130–400)
PMV BLD AUTO: 8.4 FL (ref 7.4–10.4)
PROT SERPL-MCNC: 7.4 G/DL (ref 6.4–8.2)
PROTHROMBIN TIME: 13.2 SECONDS (ref 9.6–11.5)
RBC # BLD AUTO: 5.3 X10^6/UL (ref 3.82–5.3)
TROPONIN I SERPL-MCNC: < 0.015 NG/ML (ref 0–0.04)

## 2021-07-12 PROCEDURE — 93005 ELECTROCARDIOGRAM TRACING: CPT

## 2021-07-12 PROCEDURE — 85379 FIBRIN DEGRADATION QUANT: CPT

## 2021-07-12 PROCEDURE — 84484 ASSAY OF TROPONIN QUANT: CPT

## 2021-07-12 PROCEDURE — 96374 THER/PROPH/DIAG INJ IV PUSH: CPT

## 2021-07-12 PROCEDURE — 87086 URINE CULTURE/COLONY COUNT: CPT

## 2021-07-12 PROCEDURE — 96375 TX/PRO/DX INJ NEW DRUG ADDON: CPT

## 2021-07-12 PROCEDURE — 36415 COLL VENOUS BLD VENIPUNCTURE: CPT

## 2021-07-12 PROCEDURE — 85610 PROTHROMBIN TIME: CPT

## 2021-07-12 PROCEDURE — 84703 CHORIONIC GONADOTROPIN ASSAY: CPT

## 2021-07-12 PROCEDURE — 85025 COMPLETE CBC W/AUTO DIFF WBC: CPT

## 2021-07-12 PROCEDURE — 71045 X-RAY EXAM CHEST 1 VIEW: CPT

## 2021-07-12 PROCEDURE — 80053 COMPREHEN METABOLIC PANEL: CPT

## 2021-07-12 PROCEDURE — 81001 URINALYSIS AUTO W/SCOPE: CPT

## 2021-07-12 PROCEDURE — 99285 EMERGENCY DEPT VISIT HI MDM: CPT

## 2021-07-12 NOTE — NUR
BIBA FOR STABBING L. SIDE CHEST PAIN STARTED LAST NIGHT. PT W/ EXTENSTIVE 
CARDIC HISTORY. PT TO BED WITH STEADY GAIT FROM EMS GURNEY. PT POSTIONED TO 
COMFORT. ATTACHED TO MONITORS. BRADFORD WRIGHT. AWAITING ORDERS.